# Patient Record
Sex: MALE | Race: WHITE | Employment: OTHER | URBAN - METROPOLITAN AREA
[De-identification: names, ages, dates, MRNs, and addresses within clinical notes are randomized per-mention and may not be internally consistent; named-entity substitution may affect disease eponyms.]

---

## 2020-10-17 ENCOUNTER — APPOINTMENT (OUTPATIENT)
Dept: CT IMAGING | Age: 45
End: 2020-10-17
Payer: MEDICARE

## 2020-10-17 ENCOUNTER — APPOINTMENT (OUTPATIENT)
Dept: GENERAL RADIOLOGY | Age: 45
End: 2020-10-17
Payer: MEDICARE

## 2020-10-17 ENCOUNTER — HOSPITAL ENCOUNTER (EMERGENCY)
Age: 45
Discharge: HOME | End: 2020-10-17
Attending: EMERGENCY MEDICINE
Payer: MEDICARE

## 2020-10-17 VITALS
HEART RATE: 65 BPM | BODY MASS INDEX: 23.58 KG/M2 | SYSTOLIC BLOOD PRESSURE: 135 MMHG | RESPIRATION RATE: 16 BRPM | OXYGEN SATURATION: 98 % | DIASTOLIC BLOOD PRESSURE: 88 MMHG | TEMPERATURE: 98.2 F | HEIGHT: 69 IN | WEIGHT: 159.17 LBS

## 2020-10-17 LAB
A/G RATIO: 2.4 (ref 1.1–2.2)
ALBUMIN SERPL-MCNC: 5 G/DL (ref 3.4–5)
ALP BLD-CCNC: 96 U/L (ref 40–129)
ALT SERPL-CCNC: 26 U/L (ref 10–40)
ANION GAP SERPL CALCULATED.3IONS-SCNC: 11 MMOL/L (ref 3–16)
AST SERPL-CCNC: 24 U/L (ref 15–37)
BASOPHILS ABSOLUTE: 0 K/UL (ref 0–0.2)
BASOPHILS RELATIVE PERCENT: 0.6 %
BILIRUB SERPL-MCNC: 1.5 MG/DL (ref 0–1)
BUN BLDV-MCNC: 12 MG/DL (ref 7–20)
CALCIUM SERPL-MCNC: 9.8 MG/DL (ref 8.3–10.6)
CHLORIDE BLD-SCNC: 101 MMOL/L (ref 99–110)
CO2: 26 MMOL/L (ref 21–32)
CREAT SERPL-MCNC: 0.8 MG/DL (ref 0.9–1.3)
EOSINOPHILS ABSOLUTE: 0.1 K/UL (ref 0–0.6)
EOSINOPHILS RELATIVE PERCENT: 0.7 %
GFR AFRICAN AMERICAN: >60
GFR NON-AFRICAN AMERICAN: >60
GLOBULIN: 2.1 G/DL
GLUCOSE BLD-MCNC: 110 MG/DL (ref 70–99)
HCT VFR BLD CALC: 49.1 % (ref 40.5–52.5)
HEMOGLOBIN: 16.7 G/DL (ref 13.5–17.5)
LYMPHOCYTES ABSOLUTE: 1.6 K/UL (ref 1–5.1)
LYMPHOCYTES RELATIVE PERCENT: 18.7 %
MCH RBC QN AUTO: 33.9 PG (ref 26–34)
MCHC RBC AUTO-ENTMCNC: 34.1 G/DL (ref 31–36)
MCV RBC AUTO: 99.4 FL (ref 80–100)
MONOCYTES ABSOLUTE: 0.7 K/UL (ref 0–1.3)
MONOCYTES RELATIVE PERCENT: 8.3 %
NEUTROPHILS ABSOLUTE: 5.9 K/UL (ref 1.7–7.7)
NEUTROPHILS RELATIVE PERCENT: 71.7 %
PDW BLD-RTO: 14 % (ref 12.4–15.4)
PLATELET # BLD: 233 K/UL (ref 135–450)
PMV BLD AUTO: 8.8 FL (ref 5–10.5)
POTASSIUM REFLEX MAGNESIUM: 3.7 MMOL/L (ref 3.5–5.1)
RBC # BLD: 4.94 M/UL (ref 4.2–5.9)
SODIUM BLD-SCNC: 138 MMOL/L (ref 136–145)
TOTAL PROTEIN: 7.1 G/DL (ref 6.4–8.2)
TROPONIN: <0.01 NG/ML
WBC # BLD: 8.3 K/UL (ref 4–11)

## 2020-10-17 PROCEDURE — 70450 CT HEAD/BRAIN W/O DYE: CPT

## 2020-10-17 PROCEDURE — 80053 COMPREHEN METABOLIC PANEL: CPT

## 2020-10-17 PROCEDURE — 72125 CT NECK SPINE W/O DYE: CPT

## 2020-10-17 PROCEDURE — 6360000004 HC RX CONTRAST MEDICATION: Performed by: NURSE PRACTITIONER

## 2020-10-17 PROCEDURE — 2580000003 HC RX 258: Performed by: NURSE PRACTITIONER

## 2020-10-17 PROCEDURE — 99285 EMERGENCY DEPT VISIT HI MDM: CPT

## 2020-10-17 PROCEDURE — 6360000002 HC RX W HCPCS: Performed by: NURSE PRACTITIONER

## 2020-10-17 PROCEDURE — 6370000000 HC RX 637 (ALT 250 FOR IP): Performed by: NURSE PRACTITIONER

## 2020-10-17 PROCEDURE — 84484 ASSAY OF TROPONIN QUANT: CPT

## 2020-10-17 PROCEDURE — 71260 CT THORAX DX C+: CPT

## 2020-10-17 PROCEDURE — 73030 X-RAY EXAM OF SHOULDER: CPT

## 2020-10-17 PROCEDURE — 85025 COMPLETE CBC W/AUTO DIFF WBC: CPT

## 2020-10-17 PROCEDURE — 96374 THER/PROPH/DIAG INJ IV PUSH: CPT

## 2020-10-17 PROCEDURE — 93005 ELECTROCARDIOGRAM TRACING: CPT | Performed by: NURSE PRACTITIONER

## 2020-10-17 RX ORDER — METHOCARBAMOL 500 MG/1
500 TABLET, FILM COATED ORAL 4 TIMES DAILY
Qty: 40 TABLET | Refills: 0 | Status: SHIPPED | OUTPATIENT
Start: 2020-10-17 | End: 2020-10-27

## 2020-10-17 RX ORDER — KETOROLAC TROMETHAMINE 30 MG/ML
15 INJECTION, SOLUTION INTRAMUSCULAR; INTRAVENOUS ONCE
Status: COMPLETED | OUTPATIENT
Start: 2020-10-17 | End: 2020-10-17

## 2020-10-17 RX ORDER — ORPHENADRINE CITRATE 30 MG/ML
60 INJECTION INTRAMUSCULAR; INTRAVENOUS ONCE
Status: DISCONTINUED | OUTPATIENT
Start: 2020-10-17 | End: 2020-10-17

## 2020-10-17 RX ORDER — TIZANIDINE 4 MG/1
4 TABLET ORAL ONCE
Status: COMPLETED | OUTPATIENT
Start: 2020-10-17 | End: 2020-10-17

## 2020-10-17 RX ORDER — MELOXICAM 7.5 MG/1
7.5 TABLET ORAL DAILY
Qty: 90 TABLET | Refills: 1 | Status: SHIPPED | OUTPATIENT
Start: 2020-10-17 | End: 2021-02-09 | Stop reason: SDUPTHER

## 2020-10-17 RX ORDER — HYDROCODONE BITARTRATE AND ACETAMINOPHEN 5; 325 MG/1; MG/1
1 TABLET ORAL EVERY 4 HOURS PRN
Qty: 18 TABLET | Refills: 0 | Status: SHIPPED | OUTPATIENT
Start: 2020-10-17 | End: 2020-10-20

## 2020-10-17 RX ORDER — 0.9 % SODIUM CHLORIDE 0.9 %
1000 INTRAVENOUS SOLUTION INTRAVENOUS ONCE
Status: COMPLETED | OUTPATIENT
Start: 2020-10-17 | End: 2020-10-17

## 2020-10-17 RX ADMIN — TIZANIDINE 4 MG: 4 TABLET ORAL at 19:33

## 2020-10-17 RX ADMIN — SODIUM CHLORIDE 1000 ML: 9 INJECTION, SOLUTION INTRAVENOUS at 18:50

## 2020-10-17 RX ADMIN — KETOROLAC TROMETHAMINE: 30 INJECTION, SOLUTION INTRAMUSCULAR at 18:51

## 2020-10-17 RX ADMIN — IOPAMIDOL 75 ML: 755 INJECTION, SOLUTION INTRAVENOUS at 19:27

## 2020-10-17 ASSESSMENT — PAIN SCALES - GENERAL
PAINLEVEL_OUTOF10: 5
PAINLEVEL_OUTOF10: 6
PAINLEVEL_OUTOF10: 4
PAINLEVEL_OUTOF10: 3
PAINLEVEL_OUTOF10: 3

## 2020-10-17 ASSESSMENT — PAIN - FUNCTIONAL ASSESSMENT
PAIN_FUNCTIONAL_ASSESSMENT: 0-10
PAIN_FUNCTIONAL_ASSESSMENT: PREVENTS OR INTERFERES SOME ACTIVE ACTIVITIES AND ADLS

## 2020-10-17 ASSESSMENT — PAIN DESCRIPTION - DESCRIPTORS
DESCRIPTORS: SORE
DESCRIPTORS: ACHING;TENDER
DESCRIPTORS: TENDER

## 2020-10-17 ASSESSMENT — PAIN DESCRIPTION - ORIENTATION
ORIENTATION: MID
ORIENTATION: POSTERIOR;LEFT

## 2020-10-17 ASSESSMENT — PAIN DESCRIPTION - PAIN TYPE
TYPE: ACUTE PAIN

## 2020-10-17 ASSESSMENT — PAIN DESCRIPTION - LOCATION
LOCATION: HEAD;SHOULDER
LOCATION: CHEST
LOCATION: HEAD

## 2020-10-17 ASSESSMENT — PAIN DESCRIPTION - FREQUENCY
FREQUENCY: CONTINUOUS

## 2020-10-17 ASSESSMENT — PAIN DESCRIPTION - PROGRESSION
CLINICAL_PROGRESSION: GRADUALLY IMPROVING
CLINICAL_PROGRESSION: GRADUALLY IMPROVING
CLINICAL_PROGRESSION: GRADUALLY WORSENING

## 2020-10-17 ASSESSMENT — PAIN DESCRIPTION - ONSET: ONSET: ON-GOING

## 2020-10-17 NOTE — ED NOTES
Pt returned from ct; pt notes less discomfort with deep breath 4/10; resp easy and even       Luigi Packer, SOO  10/17/20 1933

## 2020-10-17 NOTE — ED NOTES
Report from [facility] to resume care.       Luzma Mcguire RN  10/17/20 5013
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
## 2020-10-18 LAB
EKG ATRIAL RATE: 72 BPM
EKG DIAGNOSIS: NORMAL
EKG P AXIS: 33 DEGREES
EKG P-R INTERVAL: 138 MS
EKG Q-T INTERVAL: 398 MS
EKG QRS DURATION: 100 MS
EKG QTC CALCULATION (BAZETT): 435 MS
EKG R AXIS: 16 DEGREES
EKG T AXIS: 20 DEGREES
EKG VENTRICULAR RATE: 72 BPM

## 2020-10-18 PROCEDURE — 93010 ELECTROCARDIOGRAM REPORT: CPT | Performed by: INTERNAL MEDICINE

## 2020-10-18 NOTE — ED NOTES
D/C: Order noted for d/c. Pt confirmed d/c paperwork does have correct name. Discharge and education instructions reviewed with patient. Teach-back successful. Pt verbalized understanding and signed d/c papers. Pt denied questions at this time. No acute distress noted. Patient instructed to follow-up as noted - return to emergency department if symptoms worsen. Patient verbalized understanding. Discharged per EDMD with discharge instructions. Pt discharged to private vehicle. Patient stable upon departure. Thanked patient for choosing [facility] for care. Provider aware of patient pain at time of discharge.          [place] RN  10/17/20 3167
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
## 2020-11-03 ENCOUNTER — OFFICE VISIT (OUTPATIENT)
Dept: ORTHOPEDIC SURGERY | Age: 45
End: 2020-11-03
Payer: MEDICARE

## 2020-11-03 VITALS — HEIGHT: 69 IN | TEMPERATURE: 97.3 F | BODY MASS INDEX: 23.55 KG/M2 | WEIGHT: 159 LBS

## 2020-11-03 PROBLEM — S46.912A STRAIN OF LEFT SHOULDER: Status: ACTIVE | Noted: 2020-11-03

## 2020-11-03 PROBLEM — F17.200 CURRENT SMOKER: Status: ACTIVE | Noted: 2020-11-03

## 2020-11-03 PROBLEM — S16.1XXA CERVICAL STRAIN: Status: ACTIVE | Noted: 2020-11-03

## 2020-11-03 PROCEDURE — 99203 OFFICE O/P NEW LOW 30 MIN: CPT | Performed by: ORTHOPAEDIC SURGERY
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
## 2020-11-05 ENCOUNTER — TELEPHONE (OUTPATIENT)
Dept: ORTHOPEDIC SURGERY | Age: 45
End: 2020-11-05

## 2020-11-05 RX ORDER — MELOXICAM 7.5 MG/1
7.5 TABLET ORAL DAILY
Qty: 30 TABLET | Refills: 0 | Status: SHIPPED | OUTPATIENT
Start: 2020-11-05 | End: 2021-02-09 | Stop reason: SDUPTHER

## 2020-11-05 NOTE — TELEPHONE ENCOUNTER
Prescription    Medication Name:  Req Meloxicam and Robaxin  Pharmacy: [place] [phone]  Patient Contact Number:  [phone]  Pt states Dr. Shakila Barrera was going to prescribe this Rx's.

## 2020-11-05 NOTE — TELEPHONE ENCOUNTER
Called and LVM for patient. Per , ok to Rx Meloxicam but not Robaxin. Meloxicam has been sent ot pharmacy.

## 2020-12-08 ENCOUNTER — OFFICE VISIT (OUTPATIENT)
Dept: ORTHOPEDIC SURGERY | Age: 45
End: 2020-12-08
Payer: MEDICARE

## 2020-12-08 VITALS — BODY MASS INDEX: 23.55 KG/M2 | WEIGHT: 159 LBS | HEIGHT: 69 IN | TEMPERATURE: 98.9 F

## 2020-12-08 PROBLEM — M62.838 MUSCLE SPASM: Status: ACTIVE | Noted: 2020-12-08

## 2020-12-08 PROCEDURE — G8420 CALC BMI NORM PARAMETERS: HCPCS | Performed by: ORTHOPAEDIC SURGERY

## 2020-12-08 PROCEDURE — G8427 DOCREV CUR MEDS BY ELIG CLIN: HCPCS | Performed by: ORTHOPAEDIC SURGERY

## 2020-12-08 PROCEDURE — 4004F PT TOBACCO SCREEN RCVD TLK: CPT | Performed by: ORTHOPAEDIC SURGERY

## 2020-12-08 PROCEDURE — G8484 FLU IMMUNIZE NO ADMIN: HCPCS | Performed by: ORTHOPAEDIC SURGERY

## 2020-12-08 PROCEDURE — 99214 OFFICE O/P EST MOD 30 MIN: CPT | Performed by: ORTHOPAEDIC SURGERY

## 2020-12-08 RX ORDER — CYCLOBENZAPRINE HCL 5 MG
5 TABLET ORAL NIGHTLY PRN
Qty: 30 TABLET | Refills: 0 | Status: SHIPPED | OUTPATIENT
Start: 2020-12-08 | End: 2021-02-05 | Stop reason: SDUPTHER

## 2020-12-08 RX ORDER — MELOXICAM 7.5 MG/1
7.5 TABLET ORAL 2 TIMES DAILY
Qty: 60 TABLET | Refills: 0 | Status: SHIPPED | OUTPATIENT
Start: 2020-12-08 | End: 2021-02-09
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
## 2021-01-04 ENCOUNTER — HOSPITAL ENCOUNTER (OUTPATIENT)
Dept: PHYSICAL THERAPY | Age: 46
Setting detail: THERAPIES SERIES
Discharge: HOME | End: 2021-01-04
Payer: MEDICARE

## 2021-01-04 PROCEDURE — 97140 MANUAL THERAPY 1/> REGIONS: CPT

## 2021-01-04 PROCEDURE — 97161 PT EVAL LOW COMPLEX 20 MIN: CPT

## 2021-01-04 PROCEDURE — 97110 THERAPEUTIC EXERCISES: CPT

## 2021-01-04 NOTE — PLAN OF CARE
[address] [address], [address] [place] [address] Drive  Phone: [phone]   Fax:     [phone]                                                       Physical Therapy Certification    Dear Referring Practitioner: Sher Osman MD,    We had the pleasure of evaluating the following patient for physical therapy services at [address] [place] [address]. A summary of our findings can be found in the initial assessment below. This includes our plan of care. If you have any questions or concerns regarding these findings, please do not hesitate to contact me at the office phone number checked above. Thank you for the referral.       Physician Signature:_______________________________Date:__________________  By signing above (or electronic signature), therapists plan is approved by physician      Patient: Anju Heart   : 1975   MRN: [id number]  Referring Physician: Referring Practitioner: Sher Osman MD      Evaluation Date: 2021      Medical Diagnosis Information:  Diagnosis: S16. 1XXA (ICD-10-CM) - Strain of neck muscle, initial encounter , [phone] (ICD-10-CM) - Strain of left shoulder, initial encounter   Treatment Diagnosis: Impaired posture, increased muscle tension, decreased cervical ROM strength, BUE decreased strength                                         Insurance information: PT Insurance Information: [place]    Precautions/ Contra-indications:   Latex Allergy:  [x]NO      []YES  Preferred Language for Healthcare:   [x]English       []Other:    C-SSRS Triggered by Intake questionnaire (Past 2 wk assessment ):   [x] No, Questionnaire did not trigger screening.   [] Yes, Patient intake triggered C-SSRS Screening      [] C-SSRS Screening completed  [] PCP notified via Epic    SUBJECTIVE: Patient stated complaint: Mr. Valentín Hare 41 y.o. male presents today for PT evaluation of  shoulder and cervical pain which started to worsen Cervical SB 25 27   Cervical rotation 50 55        ROM Left Right   Shoulder Flex WNL WNL   Shoulder Abd WNL WNL   Shoulder ER C2 C2   Shoulder IR T8 L4             Strength / Myotomes Left Right   Cervical Flexion (C1-2)     Cervical Side-bending (C3)     Shoulder Shrug (C4)     Shoulder Flex 4 4   Shoulder Scap     Shoulder Abduction (C5) 4 4   Shoulder ER 4- 4-   Shoulder IR 4 4   Biceps (C6) 4 4   Triceps (C7) 4 4   Wrist Extension (C6)     Wrist Flexion (C7)           Thumb Abduction (C8)     Finger Abduction (T1)       Lower extremity myotomes:   []Normal     []Abnormal     Joint mobility: CERVICAL   []Normal    [x]Hypo   []Hyper    Orthopaedic Special Tests  Positive  Negative  NT Comments    Hautard's        Rhomberg       Sharps-Darryl  x     Cervical Torsion / Body Rotation   x     C2 Kick       Modified Shear       Compression  x     Distraction   x              [x] Patient history, allergies, meds reviewed. Medical chart reviewed. See intake form. Review Of Systems (ROS):  [x]Performed Review of systems (Integumentary, CardioPulmonary, Neurological) by intake and observation. Intake form has been scanned into medical record. Patient has been instructed to contact their primary care physician regarding ROS issues if not already being addressed at this time.       Co-morbidities/Complexities (which will affect course of rehabilitation):   [x]None           Arthritic conditions   []Rheumatoid arthritis (M05.9)  []Osteoarthritis (M19.91)   Cardiovascular conditions   []Hypertension (I10)  []Hyperlipidemia (E78.5)  []Angina pectoris (I20)  []Atherosclerosis (I70)   Musculoskeletal conditions   []Disc pathology   []Congenital spine pathologies   []Prior surgical intervention  []Osteoporosis (M81.8)  []Osteopenia (M85.8)   Endocrine conditions   []Hypothyroid (E03.9)  []Hyperthyroid Gastrointestinal conditions   []Constipation (Q04.84)   Metabolic conditions   []Morbid obesity (E66.01)  []Diabetes type 1(E10.65) or 2 (E11.65)   []Neuropathy (G60.9)     Pulmonary conditions   []Asthma (J45)  []Coughing   []COPD (J44.9)   Psychological Disorders  []Anxiety (F41.9)  []Depression (F32.9)   []Other:   []Other:          Barriers to/and or personal factors that will affect rehab potential:              []Age  []Sex   []Smoker              []Motivation/Lack of Motivation                        []Co-Morbidities              []Cognitive Function, education/learning barriers              []Environmental, home barriers              []profession/work barriers  []past PT/medical experience  []other:  Justification:     Falls Risk Assessment (30 days):   [x] Falls Risk assessed and no intervention required.   [] Falls Risk assessed and Patient requires intervention due to being higher risk   TUG score (>12s at risk):     [] Falls education provided, including         ASSESSMENT:    Functional Impairments:     [x]Noted cervical/thoracic/GHJ joint hypomobility   []Noted cervical/thoracic/GHJ joint hypermobility   [x]Decreased cervical/UE functional ROM   []Noted Headache pain aggravated by neck movements with/without dizziness   []Abnormal reflexes/sensation/myotomal/dermatomal deficits   []Decreased DCF control or ability to hold head up   [x]Decreased RC/scapular/core strength and neuromuscular control    [x]Decreased UE functional strength   []other:      Functional Activity Limitations (from functional questionnaire and intake)   [x]Reduced ability to tolerate prolonged functional positions   [x]Reduced ability or difficulty with changes of positions or transfers between positions   [x]Reduced ability to maintain good posture and demonstrate good body mechanics with sitting, bending, and lifting   [x] Reduced ability or tolerance with driving and/or computer work   [x]Reduced ability to perform lifting, reaching, carrying tasks   [x]Reduced ability to concentrate   [x]Reduced ability to sleep    [x]Reduced ability to tolerate any impact through UE or spine   [x]Reduced ability to ambulate prolonged functional periods/distances   []other:    Participation Restrictions   [x]Reduced participation in self care activities   [x]Reduced participation in home management activities   [x]Reduced participation in work activities   [x]Reduced participation in social activities. [x]Reduced participation in sport/recreational activities. Classification/Subgrouping:   [x]signs/symptoms consistent with neck pain with mobility deficits     []signs/symptoms consistent with neck pain with movement coordinated impairments    []signs/symptoms consistent with neck pain with radiating pain    []signs/symptoms consistent with neck pain with headaches (cervicogenic)    []Signs/symptoms consistent with nerve root involvement including myotome & dermatome dysfunction   []sign/symptoms consistent with facet dysfunction of cervical and thoracic spine    []signs/symptoms consistent suggesting central cord compression/UMN syndromes   []signs/symptoms consistent with discogenic cervical pain   []signs/symptoms consistent with rib dysfunction   [x]signs/symptoms consistent with postural dysfunction   []signs/symptoms consistent with shoulder pathology    []signs/symptoms consistent with post-surgical status including decreased ROM, strength and function.    []signs/symptoms consistent with pathology which may benefit from Dry Needling   []signs/symptoms which may limit the use of advanced manual therapy techniques: (Hypertension, recent trauma, intolerance to end range positions, prior TIA, visual issues, UE myotomes loss )     Prognosis/Rehab Potential:      []Excellent   [x]Good    []Fair   []Poor    Tolerance of evaluation/treatment:    []Excellent   [x]Good    []Fair   []Poor    Physical Therapy Evaluation Complexity Justification  [x] A history of present problem with:  [x] no personal factors and/or comorbidities that impact the plan of care;  []1-2 personal factors and/or comorbidities that impact the plan of care  []3 personal factors and/or comorbidities that impact the plan of care  [x] An examination of body systems using standardized tests and measures addressing any of the following: body structures and functions (impairments), activity limitations, and/or participation restrictions;:  [] a total of 1-2 or more elements   [x] a total of 3 or more elements   [] a total of 4 or more elements   [x] A clinical presentation with:  [x] stable and/or uncomplicated characteristics   [] evolving clinical presentation with changing characteristics  [] unstable and unpredictable characteristics;   [x] Clinical decision making of [x] low, [] moderate, [] high complexity using standardized patient assessment instrument and/or measurable assessment of functional outcome. [x] EVAL (LOW) 08289 (typically 20 minutes face-to-face)  [] EVAL (MOD) 38683 (typically 30 minutes face-to-face)  [] EVAL (HIGH) 57721 (typically 45 minutes face-to-face)  [] RE-EVAL     PLAN:   Frequency/Duration:  1-2 days per week for 6 Weeks:  Interventions:  [x]  Therapeutic exercise including: strength training, ROM, for cervical spine,scapula, core and Upper extremity, including postural re-education. [x]  NMR activation and proprioception for Deep cervical flexors, periscapular and RC muscles and Core, including postural re-education. [x]  Manual therapy as indicated for C/T spine, ribs, Soft tissue to include: Dry Needling/IASTM, STM, PROM, Gr I-IV mobilizations, manipulation. [x] Modalities as needed that may include: thermal agents, E-stim, Biofeedback, US, iontophoresis as indicated  [x] Patient education on joint protection, postural re-education, activity modification, progression of HEP.      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 Independent in HEP and progression per patient tolerance, in order to prevent re-injury. [] Progressing: [] Met: [] Not Met: [] Adjusted  2. Patient will have a decrease in pain to facilitate improvement in movement, function, and ADLs as indicated by Functional Deficits. [] Progressing: [] Met: [] Not Met: [] Adjusted    Long Term Goals: To be achieved in: 6 weeks  1. Disability index score of 15% or less for the NDI to assist with reaching prior level of function. [] Progressing: [] Met: [] Not Met: [] Adjusted  2. Patient will demonstrate increased AROM to [facility] of cervical/thoracic spine to allow for proper joint functioning as indicated by patients Functional Deficits. [] Progressing: [] Met: [] Not Met: [] Adjusted  3. Patient will demonstrate an increase in postural awareness and control and activation of  Deep cervical stabilizers to allow for proper functional mobility as indicated by patients Functional Deficits. [] Progressing: [] Met: [] Not Met: [] Adjusted  4. Patient will return to functional activities including work, lifting, without increased symptoms or restriction.    [] Progressing: [] Met: [] Not Met: [] Adjusted       Electronically signed by:  Diallo Wilson PT

## 2021-01-04 NOTE — FLOWSHEET NOTE
[address] [facility]  Phone: [phone]   Fax: [phone]    Physical Therapy Treatment Note/ Progress Report:     Date:  2021    Patient Name:  Meliton Mendoza    :  1975  MRN: [id number]  Restrictions/Precautions:    Medical/Treatment Diagnosis Information:  Diagnosis: S16. 1XXA (ICD-10-CM) - Strain of neck muscle, initial encounter , [phone] (ICD-10-CM) - Strain of left shoulder, initial encounter  Treatment Diagnosis: Impaired posture, increased muscle tension, decreased cervical ROM strength, BUE decreased strength  Insurance/Certification information:  PT Insurance Information: [place]  Physician Information:  Referring Practitioner: Negar Herrmann MD  Plan of care signed (Y/N): []  Yes [x]  No     Date of Patient follow up with Physician:      Progress Report: []  Yes  [x]  No     Date Range for reporting period:  Beginnin21  Ending:     Progress report due (10 Rx/or 30 days whichever is less): visit #10 or      Recertification due (POC duration/ or 90 days whichever is less): visit #12 or 2/15/21    Visit # Insurance Allowable Auth required?  Date Range    30 []  Yes  [x]  No      Latex Allergy:  [x]NO      []YES  Preferred Language for Healthcare:   [x]English       []other:    Functional Scale:         Date assessed:  NDI: raw score =*; dysfunction = %    npv    Pain level:  5/10     SUBJECTIVE:  See eval    OBJECTIVE: See eval   Observation:    Test measurements:      RESTRICTIONS/PRECAUTIONS:     Exercises/Interventions:   Therapeutic Exercise (80749) Resistance / level Sets/sec Reps Notes   HEP    21   UBE       pulleys                                                 Therapeutic Activities (81755)                                                 NMR re-education (25563)       Postural strengthening                                    Manual Intervention (17957)       STM, SOR, gentle distraction to cervical spine, pain free PROM x8 min                                              Patient education:  -pt educated on diagnosis, prognosis and expectations for rehab  -all pt questions were answered    Home Exercise Program:  Access Code: [email or web address]   URL: [email or web address]   Date: 01/04/2021   Prepared by: Yaquelin Omer     Exercises   Seated Scapular Retraction - 10 reps - 3 sets - 1x daily - 7x weekly   Seated Cervical Retraction - 10 reps - 3 sets - 1x daily - 7x weekly   Standing Isometric Cervical Flexion with Manual Resistance - 10 reps - 3 sets - 1x daily - 7x weekly   Standing Isometric Cervical Extension with Manual Resistance - 10 reps - 3 sets - 1x daily - 7x weekly   Seated Isometric Cervical Sidebending - 10 reps - 3 sets - 1x daily - 7x weekly       Therapeutic Exercise and NMR EXR  [x] (28370) Provided verbal/tactile cueing for activities related to strengthening, flexibility, endurance, ROM  for improvements in cervical, postural, scapular, scapulothoracic and UE control with self care, reaching, carrying, lifting, house/yardwork, driving/computer work.    [] (33408) Provided verbal/tactile cueing for activities related to improving balance, coordination, kinesthetic sense, posture, motor skill, proprioception  to assist with cervical, scapular, scapulothoracic and UE control with self care, reaching, carrying, lifting, house/yardwork, driving/computer work.  [] (01951) Therapist is in constant attendance of 2 or more patients providing skilled therapy interventions, but not providing any significant amount of measurable one-on-one time to either patient, for improvements in cervical, scapular, scapulothoracic and UE control with self care, reaching, carrying, lifting, house/yardwork, driving, computer work.      Therapeutic Activities:    [] (10954 or 54675) Provided verbal/tactile cueing for activities related to improving balance, coordination, kinesthetic sense, posture, motor skill, proprioception and motor activation to allow for proper function of cervical, scapular, scapulothoracic and UE control with self care, carrying, lifting, driving/computer work. Home Exercise Program:    [x] (50265) Reviewed/Progressed HEP activities related to strengthening, flexibility, endurance, ROM of cervical, scapular, scapulothoracic and UE control with self care, reaching, carrying, lifting, house/yardwork, driving/computer work  [] (29756) Reviewed/Progressed HEP activities related to improving balance, coordination, kinesthetic sense, posture, motor skill, proprioception of cervical, scapular, scapulothoracic and UE control with self care, reaching, carrying, lifting, house/yardwork, driving/computer work      Manual Treatments:  PROM / STM / Oscillations-Mobs:  G-I, II, III, IV (PA's, Inf., Post.)  [] (44920) Provided manual therapy to mobilize soft tissue/joints of cervical/CT, scapular GHJ and UE for the purpose of decreasing headache, modulating pain, promoting relaxation,  increasing ROM, reducing/eliminating soft tissue swelling/inflammation/restriction, improving soft tissue extensibility and allowing for proper ROM for normal function with self care, reaching, carrying, lifting, house/yardwork, driving/computer work    Modalities:  Possible HEAT/ESTIM? 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  Independent in HEP and progression per patient tolerance, in order to prevent re-injury. [] Progressing: [] Met: [] Not Met: [] Adjusted  2. Patient will have a decrease in pain to facilitate improvement in movement, function, and ADLs as indicated by Functional Deficits. [] Progressing: [] Met: [] Not Met: [] Adjusted    Long Term Goals: To be achieved in: 6 weeks  1. Disability index score of 15% or less for the NDI to assist with reaching prior level of function. [] Progressing: [] Met: [] Not Met: [] Adjusted  2. Patient will demonstrate increased AROM to [facility] of cervical/thoracic spine to allow for proper joint functioning as indicated by patients Functional Deficits. [] Progressing: [] Met: [] Not Met: [] Adjusted  3. Patient will demonstrate an increase in postural awareness and control and activation of  Deep cervical stabilizers to allow for proper functional mobility as indicated by patients Functional Deficits. [] Progressing: [] Met: [] Not Met: [] Adjusted  4. Patient will return to functional activities including work, lifting, without increased symptoms or restriction. [] Progressing: [] Met: [] Not Met: [] Adjusted    Overall Progression Towards Functional goals/ Treatment Progress Update:  [] Patient is progressing as expected towards functional goals listed. [] Progression is slowed due to complexities/Impairments listed. [] Progression has been slowed due to co-morbidities.   [x] Plan just implemented, too soon to assess goals progression <30days   [] Goals require adjustment due to lack of progress  [] Patient is not progressing as expected and requires additional follow up with physician  [] Other    Persisting Functional Limitations/Impairments:  []Sitting []Standing   []Walking []Squatting/bending    []Stairs []ADL's    []Transfers []Reaching  []Housework []Lifting  []Driving []Job related tasks  []Sports/Recreation  []Sleeping  []Other:    ASSESSMENT:  See eval  Treatment/Activity Tolerance:  [] Patient able to complete tx  [] Patient limited by fatique  [] Patient limited by pain  [] Patient limited by other medical complications  [] Other:     Prognosis: [x] Good [] Fair  [] Poor    Patient Requires Follow-up: [x] Yes  [] No    PLAN: See eval. PT 1-2x / week for 6 weeks. Consider dry needling if no progress   [] Continue per plan of care [] Alter current plan (see comments)  [x] Plan of care initiated [] Hold pending MD visit [] Discharge    Electronically signed by: Addison Velasco PT       Note: If patient does not return for scheduled/ recommended follow up visits, this note will serve as a discharge from care along with most recent update on progress.

## 2021-01-11 ENCOUNTER — HOSPITAL ENCOUNTER (OUTPATIENT)
Dept: PHYSICAL THERAPY | Age: 46
Setting detail: THERAPIES SERIES
Discharge: HOME | End: 2021-01-11
Payer: MEDICARE

## 2021-01-11 PROCEDURE — 97110 THERAPEUTIC EXERCISES: CPT

## 2021-01-11 PROCEDURE — 97140 MANUAL THERAPY 1/> REGIONS: CPT

## 2021-01-11 PROCEDURE — G0283 ELEC STIM OTHER THAN WOUND: HCPCS

## 2021-01-11 NOTE — FLOWSHEET NOTE
NMR re-education (93302)       Prone chin tucks   5''x15     Prone scap retraction   5''x15            Postural strengthening                                           Manual Intervention (07800)       STM to upper trap, SOR, gentle distraction to cervical spine, pain free PROM x10 min                                              Patient education:  -pt educated on diagnosis, prognosis and expectations for rehab  -all pt questions were answered    Home Exercise Program:  Access Code: [email or web address]   URL: [email or web address]   Date: 01/04/2021   Prepared by: Axel Kathryn     Exercises   Seated Scapular Retraction - 10 reps - 3 sets - 1x daily - 7x weekly   Seated Cervical Retraction - 10 reps - 3 sets - 1x daily - 7x weekly   Standing Isometric Cervical Flexion with Manual Resistance - 10 reps - 3 sets - 1x daily - 7x weekly   Standing Isometric Cervical Extension with Manual Resistance - 10 reps - 3 sets - 1x daily - 7x weekly   Seated Isometric Cervical Sidebending - 10 reps - 3 sets - 1x daily - 7x weekly       Therapeutic Exercise and NMR EXR  [x] (83956) Provided verbal/tactile cueing for activities related to strengthening, flexibility, endurance, ROM  for improvements in cervical, postural, scapular, scapulothoracic and UE control with self care, reaching, carrying, lifting, house/yardwork, driving/computer work.    [] (86797) Provided verbal/tactile cueing for activities related to improving balance, coordination, kinesthetic sense, posture, motor skill, proprioception  to assist with cervical, scapular, scapulothoracic and UE control with self care, reaching, carrying, lifting, house/yardwork, driving/computer work.  [] (69363) Therapist is in constant attendance of 2 or more patients providing skilled therapy interventions, but not providing any significant amount of measurable one-on-one time to either patient, for improvements in cervical, scapular, scapulothoracic and UE control with self care, reaching, carrying, lifting, house/yardwork, driving, computer work. Therapeutic Activities:    [] (62111 or 12248) Provided verbal/tactile cueing for activities related to improving balance, coordination, kinesthetic sense, posture, motor skill, proprioception and motor activation to allow for proper function of cervical, scapular, scapulothoracic and UE control with self care, carrying, lifting, driving/computer work.      Home Exercise Program:    [x] (08634) Reviewed/Progressed HEP activities related to strengthening, flexibility, endurance, ROM of cervical, scapular, scapulothoracic and UE control with self care, reaching, carrying, lifting, house/yardwork, driving/computer work  [] (09409) Reviewed/Progressed HEP activities related to improving balance, coordination, kinesthetic sense, posture, motor skill, proprioception of cervical, scapular, scapulothoracic and UE control with self care, reaching, carrying, lifting, house/yardwork, driving/computer work      Manual Treatments:  PROM / STM / Oscillations-Mobs:  G-I, II, III, IV (PA's, Inf., Post.)  [x] (73856) Provided manual therapy to mobilize soft tissue/joints of cervical/CT, scapular GHJ and UE for the purpose of decreasing headache, modulating pain, promoting relaxation,  increasing ROM, reducing/eliminating soft tissue swelling/inflammation/restriction, improving soft tissue extensibility and allowing for proper ROM for normal function with self care, reaching, carrying, lifting, house/yardwork, driving/computer work    Modalities:  IFC to neck/ mid back for 12 minutes with P    Charges:  Timed Code Treatment Minutes: 30   Total Treatment Minutes: 45       [] EVAL - LOW (25501)   [] EVAL - MOD (19843)  [] EVAL - HIGH (50340)  [] RE-EVAL (02903)  [x] FF(33218) x 1         [] NMR (53287) x      [] Ionto  [x] Manual (88855) x 1       [] Ultrasound  [] TA x       [] Mech Traction (98571)  [] Home Management Training x   [x] ES (un) (61221):           [] Aquatic    [] ES(attended) (74069)   [] Group    [] Other:    GOALS:  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 Independent in HEP and progression per patient tolerance, in order to prevent re-injury. [] Progressing: [] Met: [] Not Met: [] Adjusted  2. Patient will have a decrease in pain to facilitate improvement in movement, function, and ADLs as indicated by Functional Deficits. [] Progressing: [] Met: [] Not Met: [] Adjusted    Long Term Goals: To be achieved in: 6 weeks  1. Disability index score of 15% or less for the NDI to assist with reaching prior level of function. [] Progressing: [] Met: [] Not Met: [] Adjusted  2. Patient will demonstrate increased AROM to [facility] of cervical/thoracic spine to allow for proper joint functioning as indicated by patients Functional Deficits. [] Progressing: [] Met: [] Not Met: [] Adjusted  3. Patient will demonstrate an increase in postural awareness and control and activation of  Deep cervical stabilizers to allow for proper functional mobility as indicated by patients Functional Deficits. [] Progressing: [] Met: [] Not Met: [] Adjusted  4. Patient will return to functional activities including work, lifting, without increased symptoms or restriction. [] Progressing: [] Met: [] Not Met: [] Adjusted    Overall Progression Towards Functional goals/ Treatment Progress Update:  [] Patient is progressing as expected towards functional goals listed. [] Progression is slowed due to complexities/Impairments listed. [] Progression has been slowed due to co-morbidities.   [x] Plan just implemented, too soon to assess goals progression <30days   [] Goals require adjustment due to lack of progress  [] Patient is not progressing as expected and requires additional follow up with physician  [] Other    Persisting Functional Limitations/Impairments:  []Sitting []Standing   []Walking []Squatting/bending    []Stairs []ADL's    []Transfers []Reaching  []Housework []Lifting  []Driving []Job related tasks  []Sports/Recreation  []Sleeping  []Other:    ASSESSMENT:  Pt tolerated tx fairly today with increase in pain to R shoulder/periscap area with UBE, responded well to manual and ESTIM. Pt performed exercises slowly due to apprehension to movement. Assess pt response at next visit. Treatment/Activity Tolerance:  [] Patient able to complete tx  [] Patient limited by fatique  [] Patient limited by pain  [] Patient limited by other medical complications  [] Other:     Prognosis: [x] Good [] Fair  [] Poor    Patient Requires Follow-up: [x] Yes  [] No    PLAN: See eval. PT 1-2x / week for 6 weeks. Consider dry needling if no progress   [x] Continue per plan of care [] Alter current plan (see comments)  [] Plan of care initiated [] Hold pending MD visit [] Discharge    Electronically signed by: Randy Daniels PT       Note: If patient does not return for scheduled/ recommended follow up visits, this note will serve as a discharge from care along with most recent update on progress.

## 2021-01-14 ENCOUNTER — HOSPITAL ENCOUNTER (OUTPATIENT)
Dept: PHYSICAL THERAPY | Age: 46
Setting detail: THERAPIES SERIES
Discharge: HOME | End: 2021-01-14
Payer: MEDICARE

## 2021-01-14 PROCEDURE — G0283 ELEC STIM OTHER THAN WOUND: HCPCS

## 2021-01-14 PROCEDURE — 97140 MANUAL THERAPY 1/> REGIONS: CPT

## 2021-01-14 PROCEDURE — 97110 THERAPEUTIC EXERCISES: CPT

## 2021-01-14 NOTE — FLOWSHEET NOTE
[address] [facility] Physical Therapy  Phone: [phone]   Fax: [phone]    Physical Therapy Treatment Note/ Progress Report:     Date:  2021    Patient Name:  Edilia Hester    :  1975  MRN: [id number]  Restrictions/Precautions:    Medical/Treatment Diagnosis Information:  Diagnosis: S16. 1XXA (ICD-10-CM) - Strain of neck muscle, initial encounter , [phone] (ICD-10-CM) - Strain of left shoulder, initial encounter  Treatment Diagnosis: Impaired posture, increased muscle tension, decreased cervical ROM strength, BUE decreased strength  Insurance/Certification information:  PT Insurance Information: [place]  Physician Information:  Referring Practitioner: Marius Spatz, MD  Plan of care signed (Y/N): []  Yes [x]  No     Date of Patient follow up with Physician:      Progress Report: []  Yes  [x]  No     Date Range for reporting period:  Beginnin21  Ending:     Progress report due (10 Rx/or 30 days whichever is less): visit #10 or 24     Recertification due (POC duration/ or 90 days whichever is less): visit #12 or 2/15/21    Visit # Insurance Allowable Auth required? Date Range   3/12 30 []  Yes  [x]  No      Latex Allergy:  [x]NO      []YES  Preferred Language for Healthcare:   [x]English       []other:    Functional Scale:         Date assessed:  NDI: raw score =*; dysfunction = %        Pain level:  4-6/10 mid back      SUBJECTIVE:  Pt reports he may have gotten a little relief after last treatment but did not last long and pain today is 5/10- ACHY. 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       OBJECTIVE:    Observation:    Test measurements:      RESTRICTIONS/PRECAUTIONS:     Exercises/Interventions:   Therapeutic Exercise (55958) Resistance / level Sets/sec Reps Notes   UBE 1.0   Painful - stopped    pulleys  x4 min      Chin tucks  5''x10     Upper trap stretch  2x30''     Post shoulder stretch   2x30'' Doorway pec stretch   2x30''  Hands high x2  Hands low x2           TB:  Mid row  Ext                                                                        Therapeutic Activities (83495)                                                                             NMR re-education (40293)       Prone chin tucks       Prone scap retraction              Postural strengthening               Nerve flossing                             Manual Intervention (59208)       STM to upper trap, SOR, gentle distraction to cervical spine, pain free PROM x12 min                                              Patient education:  -pt educated on diagnosis, prognosis and expectations for rehab  -all pt questions were answered    Home Exercise Program:  Access Code: [email or web address]   URL: [employer]/   Date: 01/04/2021   Prepared by: López Herder     Exercises   Seated Scapular Retraction - 10 reps - 3 sets - 1x daily - 7x weekly   Seated Cervical Retraction - 10 reps - 3 sets - 1x daily - 7x weekly   Standing Isometric Cervical Flexion with Manual Resistance - 10 reps - 3 sets - 1x daily - 7x weekly   Standing Isometric Cervical Extension with Manual Resistance - 10 reps - 3 sets - 1x daily - 7x weekly   Seated Isometric Cervical Sidebending - 10 reps - 3 sets - 1x daily - 7x weekly     Access Code: [email or web address]   URL: [employer]/   Date: 01/14/2021   Prepared by: López Herder     Exercises   Seated Levator Scapulae Stretch - 10 reps - 3 sets - 1x daily - 7x weekly   Seated Cervical Sidebending Stretch - 10 reps - 3 sets - 1x daily - 7x weekly   Doorway Pec Stretch at 90 Degrees Abduction - 10 reps - 3 sets - 1x daily - 7x weekly   Standing Shoulder Posterior Capsule Stretch - 10 reps - 3 sets - 1x daily - 7x weekly         Therapeutic Exercise and NMR EXR  [x] (03179) Provided verbal/tactile cueing for activities related to strengthening, flexibility, endurance, ROM  for improvements in cervical, postural, scapular, scapulothoracic and UE control with self care, reaching, carrying, lifting, house/yardwork, driving/computer work.    [] (05870) Provided verbal/tactile cueing for activities related to improving balance, coordination, kinesthetic sense, posture, motor skill, proprioception  to assist with cervical, scapular, scapulothoracic and UE control with self care, reaching, carrying, lifting, house/yardwork, driving/computer work.  [] (53116) Therapist is in constant attendance of 2 or more patients providing skilled therapy interventions, but not providing any significant amount of measurable one-on-one time to either patient, for improvements in cervical, scapular, scapulothoracic and UE control with self care, reaching, carrying, lifting, house/yardwork, driving, computer work. Therapeutic Activities:    [] (41169 or 12633) Provided verbal/tactile cueing for activities related to improving balance, coordination, kinesthetic sense, posture, motor skill, proprioception and motor activation to allow for proper function of cervical, scapular, scapulothoracic and UE control with self care, carrying, lifting, driving/computer work.      Home Exercise Program:    [x] (31406) Reviewed/Progressed HEP activities related to strengthening, flexibility, endurance, ROM of cervical, scapular, scapulothoracic and UE control with self care, reaching, carrying, lifting, house/yardwork, driving/computer work  [] (47065) Reviewed/Progressed HEP activities related to improving balance, coordination, kinesthetic sense, posture, motor skill, proprioception of cervical, scapular, scapulothoracic and UE control with self care, reaching, carrying, lifting, house/yardwork, driving/computer work      Manual Treatments:  PROM / STM / Oscillations-Mobs:  G-I, II, III, IV (PA's, Inf., Post.)  [x] (37139) Provided manual therapy to mobilize soft tissue/joints of cervical/CT, scapular GHJ and UE for the purpose of decreasing headache, modulating pain, promoting relaxation,  increasing ROM, reducing/eliminating soft tissue swelling/inflammation/restriction, improving soft tissue extensibility and allowing for proper ROM for normal function with self care, reaching, carrying, lifting, house/yardwork, driving/computer work    Modalities:  IFC to neck/ mid back for 15 minutes with MHP    Charges:  Timed Code Treatment Minutes: 40   Total Treatment Minutes: 55       [] EVAL - LOW (11888)   [] EVAL - MOD (03002)  [] EVAL - HIGH (30781)  [] RE-EVAL (68274)  [x] YH(24043) x 2         [] NMR (34544) x      [] Ionto  [x] Manual (88870) x 1       [] Ultrasound  [] TA x       [] Mech Traction (80675)  [] Home Management Training x   [x] ES (un) (87670):           [] Aquatic    [] ES(attended) (59425)   [] Group    [] Other:    GOALS:  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 Independent in HEP and progression per patient tolerance, in order to prevent re-injury. [] Progressing: [] Met: [] Not Met: [] Adjusted  2. Patient will have a decrease in pain to facilitate improvement in movement, function, and ADLs as indicated by Functional Deficits. [] Progressing: [] Met: [] Not Met: [] Adjusted    Long Term Goals: To be achieved in: 6 weeks  1. Disability index score of 15% or less for the NDI to assist with reaching prior level of function. [] Progressing: [] Met: [] Not Met: [] Adjusted  2. Patient will demonstrate increased AROM to [facility] of cervical/thoracic spine to allow for proper joint functioning as indicated by patients Functional Deficits. [] Progressing: [] Met: [] Not Met: [] Adjusted  3. Patient will demonstrate an increase in postural awareness and control and activation of  Deep cervical stabilizers to allow for proper functional mobility as indicated by patients Functional Deficits.    [] Progressing: [] Met: [] Not Met: [] Adjusted  4. Patient will return to functional activities including work, lifting, without increased symptoms or restriction. [] Progressing: [] Met: [] Not Met: [] Adjusted    Overall Progression Towards Functional goals/ Treatment Progress Update:  [] Patient is progressing as expected towards functional goals listed. [] Progression is slowed due to complexities/Impairments listed. [] Progression has been slowed due to co-morbidities. [x] Plan just implemented, too soon to assess goals progression <30days   [] Goals require adjustment due to lack of progress  [] Patient is not progressing as expected and requires additional follow up with physician  [] Other    Persisting Functional Limitations/Impairments:  []Sitting []Standing   []Walking []Squatting/bending    []Stairs []ADL's    []Transfers []Reaching  []Housework []Lifting  []Driving []Job related tasks  []Sports/Recreation  []Sleeping  []Other:    ASSESSMENT:  Pt with good session today with tx focus on manual and stretches. Pt reports stretches felt good and PT educated pt to do stretches 2-3x/day due to increased muscle tension in neck/ mid back. Handout given. Treatment/Activity Tolerance:  [] Patient able to complete tx  [] Patient limited by fatique  [] Patient limited by pain  [] Patient limited by other medical complications  [] Other:     Prognosis: [x] Good [] Fair  [] Poor    Patient Requires Follow-up: [x] Yes  [] No    PLAN: See eval. PT 1-2x / week for 6 weeks. Consider dry needling if no progress   [x] Continue per plan of care [] Alter current plan (see comments)  [] Plan of care initiated [] Hold pending MD visit [] Discharge    Electronically signed by: Stacey Thomas PT       Note: If patient does not return for scheduled/ recommended follow up visits, this note will serve as a discharge from care along with most recent update on progress.

## 2021-01-18 ENCOUNTER — HOSPITAL ENCOUNTER (OUTPATIENT)
Dept: PHYSICAL THERAPY | Age: 46
Setting detail: THERAPIES SERIES
Discharge: HOME | End: 2021-01-18
Payer: MEDICARE

## 2021-01-18 PROCEDURE — 97110 THERAPEUTIC EXERCISES: CPT

## 2021-01-18 PROCEDURE — 97112 NEUROMUSCULAR REEDUCATION: CPT

## 2021-01-18 NOTE — FLOWSHEET NOTE
[address] [facility] Physical Therapy  Phone: [phone]   Fax: [phone]    Physical Therapy Treatment Note/ Progress Report:     Date:  2021    Patient Name:  Anju Heart    :  1975  MRN: [id number]  Restrictions/Precautions:    Medical/Treatment Diagnosis Information:  Diagnosis: S16. 1XXA (ICD-10-CM) - Strain of neck muscle, initial encounter , [phone] (ICD-10-CM) - Strain of left shoulder, initial encounter  Treatment Diagnosis: Impaired posture, increased muscle tension, decreased cervical ROM strength, BUE decreased strength  Insurance/Certification information:  PT Insurance Information: [place]  Physician Information:  Referring Practitioner: Sher Osman MD  Plan of care signed (Y/N): []  Yes [x]  No     Date of Patient follow up with Physician:      Progress Report: []  Yes  [x]  No     Date Range for reporting period:  Beginnin21  Ending:     Progress report due (10 Rx/or 30 days whichever is less): visit #10 or 06     Recertification due (POC duration/ or 90 days whichever is less): visit #12 or 2/15/21    Visit # Insurance Allowable Auth required? Date Range    30 []  Yes  [x]  No      Latex Allergy:  [x]NO      []YES  Preferred Language for Healthcare:   [x]English       []other:    Functional Scale:         Date assessed:  NDI: raw score =*; dysfunction = %        Pain level:  4-6/10 mid back      SUBJECTIVE:  Pt reports when he puts his arms above 90 degrees when sleeping he gets a cold, numbness feeling. Also when he lays on the couch on his side he has his arm up and symptoms can come on with that as well.      OBJECTIVE:    Observation:    Test measurements:      RESTRICTIONS/PRECAUTIONS:     Exercises/Interventions:   Therapeutic Exercise (45974) Resistance / level Sets/sec Reps Notes   UBE 1.0   Painful - stopped    pulleys  x4 min      Chin tucks  5''x10     Upper trap stretch  2x30''     Post shoulder stretch   2x30''     Doorway pec stretch   2x30''  Hands high x2  Hands low x2    Standing thread the needle   5''x10 B      S/L open book  5''x5 B     TB:   Mid row  Ext                                                                        Therapeutic Activities (41196)                                                                             NMR re-education (56225)       Prone chin tucks       Prone scap retraction              Postural strengthening               Median Nerve flossing   Ulnar nerve flossing   5''x5 B  5''x5 B                          Manual Intervention (44653)       STM to upper trap, SOR, gentle distraction to cervical spine, pain free PROM                                              Patient education:  -pt educated on diagnosis, prognosis and expectations for rehab  -all pt questions were answered    Home Exercise Program:  Access Code: [email or web address]   URL: [email or web address]   Date: 01/04/2021   Prepared by: Lucien Holt     Exercises   Seated Scapular Retraction - 10 reps - 3 sets - 1x daily - 7x weekly   Seated Cervical Retraction - 10 reps - 3 sets - 1x daily - 7x weekly   Standing Isometric Cervical Flexion with Manual Resistance - 10 reps - 3 sets - 1x daily - 7x weekly   Standing Isometric Cervical Extension with Manual Resistance - 10 reps - 3 sets - 1x daily - 7x weekly   Seated Isometric Cervical Sidebending - 10 reps - 3 sets - 1x daily - 7x weekly     Access Code: [email or web address]   URL: [employer]/   Date: 01/14/2021   Prepared by: Lucien Holt     Exercises   Seated Levator Scapulae Stretch - 10 reps - 3 sets - 1x daily - 7x weekly   Seated Cervical Sidebending Stretch - 10 reps - 3 sets - 1x daily - 7x weekly   Doorway Pec Stretch at 90 Degrees Abduction - 10 reps - 3 sets - 1x daily - 7x weekly   Standing Shoulder Posterior Capsule Stretch - 10 reps - 3 sets - 1x daily - 7x weekly       Access Code: [email or web address]   URL: [employer]/   Date: 01/18/2021   Prepared by: Lucien Holt Exercises   Sidelying Thoracic Rotation - 10 reps - 3 sets - 1x daily - 7x weekly   Median Nerve Flossing - Tray - 10 reps - 3 sets - 1x daily - 7x weekly   Standing Radial Nerve Glide - 10 reps - 3 sets - 1x daily - 7x weekly   Ulnar Nerve Flossing - 10 reps - 3 sets - 1x daily - 7x weekly         Therapeutic Exercise and NMR EXR  [x] (13437) Provided verbal/tactile cueing for activities related to strengthening, flexibility, endurance, ROM  for improvements in cervical, postural, scapular, scapulothoracic and UE control with self care, reaching, carrying, lifting, house/yardwork, driving/computer work.    [] (40097) Provided verbal/tactile cueing for activities related to improving balance, coordination, kinesthetic sense, posture, motor skill, proprioception  to assist with cervical, scapular, scapulothoracic and UE control with self care, reaching, carrying, lifting, house/yardwork, driving/computer work.  [] (95121) Therapist is in constant attendance of 2 or more patients providing skilled therapy interventions, but not providing any significant amount of measurable one-on-one time to either patient, for improvements in cervical, scapular, scapulothoracic and UE control with self care, reaching, carrying, lifting, house/yardwork, driving, computer work. Therapeutic Activities:    [] (02313 or 17944) Provided verbal/tactile cueing for activities related to improving balance, coordination, kinesthetic sense, posture, motor skill, proprioception and motor activation to allow for proper function of cervical, scapular, scapulothoracic and UE control with self care, carrying, lifting, driving/computer work.      Home Exercise Program:    [x] (40806) Reviewed/Progressed HEP activities related to strengthening, flexibility, endurance, ROM of cervical, scapular, scapulothoracic and UE control with self care, reaching, carrying, lifting, house/yardwork, driving/computer work  [] (45412) Reviewed/Progressed HEP activities related to improving balance, coordination, kinesthetic sense, posture, motor skill, proprioception of cervical, scapular, scapulothoracic and UE control with self care, reaching, carrying, lifting, house/yardwork, driving/computer work      Manual Treatments:  PROM / STM / Oscillations-Mobs:  G-I, II, III, IV (PA's, Inf., Post.)  [x] (19754) Provided manual therapy to mobilize soft tissue/joints of cervical/CT, scapular GHJ and UE for the purpose of decreasing headache, modulating pain, promoting relaxation,  increasing ROM, reducing/eliminating soft tissue swelling/inflammation/restriction, improving soft tissue extensibility and allowing for proper ROM for normal function with self care, reaching, carrying, lifting, house/yardwork, driving/computer work    Modalities:  I    Charges:  Timed Code Treatment Minutes: 46   Total Treatment Minutes: 46       [] EVAL - LOW (62459)   [] EVAL - MOD (33082)  [] EVAL - HIGH (95630)  [] RE-EVAL (37283)  [x] DD(89472) x 2         [x] NMR (11507) x 1     [] Ionto  [] Manual (06719) x 1       [] Ultrasound  [] TA x       [] Mech Traction (65916)  [] Home Management Training x   [] ES (un) (39451):           [] Aquatic    [] ES(attended) (78324)   [] Group    [] Other:    GOALS:  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 Independent in HEP and progression per patient tolerance, in order to prevent re-injury. [] Progressing: [] Met: [] Not Met: [] Adjusted  2. Patient will have a decrease in pain to facilitate improvement in movement, function, and ADLs as indicated by Functional Deficits. [] Progressing: [] Met: [] Not Met: [] Adjusted    Long Term Goals: To be achieved in: 6 weeks  1. Disability index score of 15% or less for the NDI to assist with reaching prior level of function.    [] Progressing: [] Met: [] Not Met: [] Adjusted  2. Patient will demonstrate increased AROM to [facility] of cervical/thoracic spine to allow for proper joint functioning as indicated by patients Functional Deficits. [] Progressing: [] Met: [] Not Met: [] Adjusted  3. Patient will demonstrate an increase in postural awareness and control and activation of  Deep cervical stabilizers to allow for proper functional mobility as indicated by patients Functional Deficits. [] Progressing: [] Met: [] Not Met: [] Adjusted  4. Patient will return to functional activities including work, lifting, without increased symptoms or restriction. [] Progressing: [] Met: [] Not Met: [] Adjusted    Overall Progression Towards Functional goals/ Treatment Progress Update:  [] Patient is progressing as expected towards functional goals listed. [] Progression is slowed due to complexities/Impairments listed. [] Progression has been slowed due to co-morbidities. [x] Plan just implemented, too soon to assess goals progression <30days   [] Goals require adjustment due to lack of progress  [] Patient is not progressing as expected and requires additional follow up with physician  [] Other    Persisting Functional Limitations/Impairments:  []Sitting []Standing   []Walking []Squatting/bending    []Stairs []ADL's    []Transfers []Reaching  []Housework []Lifting  []Driving []Job related tasks  []Sports/Recreation  []Sleeping  []Other:    ASSESSMENT:  Pt presents with signs and symptoms consistent with Upper extremity nerve tension , upper trap over activation and thoracic outlet syndrome symptoms. Pt requiring cues for decrease upper trap recruitment during entire treatment. No estim today due to pt not getting any relief from it. Continue to progress tx to address above symptoms.      Treatment/Activity Tolerance:  [] Patient able to complete tx  [] Patient limited by fatique  [] Patient limited by pain  [] Patient limited by other medical complications  [] Other: Prognosis: [x] Good [] Fair  [] Poor    Patient Requires Follow-up: [x] Yes  [] No    PLAN: See eval. PT 1-2x / week for 6 weeks. Consider dry needling if no progress   [x] Continue per plan of care [] Alter current plan (see comments)  [] Plan of care initiated [] Hold pending MD visit [] Discharge    Electronically signed by: Ang Rodriguez, PT       Note: If patient does not return for scheduled/ recommended follow up visits, this note will serve as a discharge from care along with most recent update on progress.

## 2021-01-21 ENCOUNTER — HOSPITAL ENCOUNTER (OUTPATIENT)
Dept: PHYSICAL THERAPY | Age: 46
Setting detail: THERAPIES SERIES
Discharge: HOME | End: 2021-01-21
Payer: MEDICARE

## 2021-01-21 PROCEDURE — 97530 THERAPEUTIC ACTIVITIES: CPT

## 2021-01-21 PROCEDURE — 97110 THERAPEUTIC EXERCISES: CPT

## 2021-01-21 NOTE — FLOWSHEET NOTE
[address] [facility]  Phone: [phone]   Fax: [phone]    Physical Therapy Treatment Note/ Progress Report:     Date:  2021    Patient Name:  Rajan Tinajero    :  1975  MRN: [id number]  Restrictions/Precautions:    Medical/Treatment Diagnosis Information:  Diagnosis: S16. 1XXA (ICD-10-CM) - Strain of neck muscle, initial encounter , [phone] (ICD-10-CM) - Strain of left shoulder, initial encounter  Treatment Diagnosis: Impaired posture, increased muscle tension, decreased cervical ROM strength, BUE decreased strength  Insurance/Certification information:  PT Insurance Information: [place]  Physician Information:  Referring Practitioner: Tomas Shrestha MD  Plan of care signed (Y/N): []  Yes [x]  No     Date of Patient follow up with Physician:      Progress Report: []  Yes  [x]  No     Date Range for reporting period:  Beginnin21  Ending:     Progress report due (10 Rx/or 30 days whichever is less): visit #10 or 3/8/77     Recertification due (POC duration/ or 90 days whichever is less): visit #12 or 2/15/21    Visit # Insurance Allowable Auth required? Date Range    30 []  Yes  [x]  No      Latex Allergy:  [x]NO      []YES  Preferred Language for Healthcare:   [x]English       []other:    Functional Scale:         Date assessed:  NDI: raw score =*; dysfunction = %        Pain level:  4/10 mid back      SUBJECTIVE:   Pt reports pain is not as bad lately due to stretches and nerve flosses. Pt did go play disc golf yesterday and is alittle sore from that and kind of dizzy at times. Pt reports he is doing HEP periodically throughout the day.      OBJECTIVE:    Observation:    Test measurements:  : Pulse - 62 bpm - normal rhythm     RESTRICTIONS/PRECAUTIONS:     Exercises/Interventions:   Therapeutic Exercise (44487) Resistance / level Sets/sec Reps Notes   UBE 1.0   Painful - stopped    pulleys  x2 min      Chin tucks  5''x10     Upper trap stretch  2x30''     Post shoulder stretch   2x30''     Doorway pec stretch   2x30''  Hands high x2  Hands low x2    Standing thread the needle   5''x10 B      S/L open book  5''x5 B     TB:   Mid row  Ext         No money ER       Serratus push up        Chin tuck with BUE flexion to 90   5''x10     Thoracic ext sitting at chair   5''X10  Towel at T6                                      Therapeutic Activities (57097)       diaphramic breathing in supine   X8MIN  Max cues on how to perform                                                                  NMR re-education (95706)       Prone chin tucks       Prone scap retraction              Postural strengthening               Median Nerve flossing   Ulnar nerve flossing   5''x5 B  5''x5 B                          Manual Intervention (63665)       STM to upper trap, SOR, gentle distraction to cervical spine, pain free PROM                                              Patient education:  -pt educated on diagnosis, prognosis and expectations for rehab  -all pt questions were answered    Home Exercise Program:  Access Code: [email or web address]   URL: [email or web address]   Date: 01/04/2021   Prepared by: Ardell Lawnside     Exercises   Seated Scapular Retraction - 10 reps - 3 sets - 1x daily - 7x weekly   Seated Cervical Retraction - 10 reps - 3 sets - 1x daily - 7x weekly   Standing Isometric Cervical Flexion with Manual Resistance - 10 reps - 3 sets - 1x daily - 7x weekly   Standing Isometric Cervical Extension with Manual Resistance - 10 reps - 3 sets - 1x daily - 7x weekly   Seated Isometric Cervical Sidebending - 10 reps - 3 sets - 1x daily - 7x weekly     Access Code: [email or web address]   URL: [email or web address]   Date: 01/14/2021   Prepared by: Ardell Lawnside     Exercises   Seated Levator Scapulae Stretch - 10 reps - 3 sets - 1x daily - 7x weekly   Seated Cervical Sidebending Stretch - 10 reps - 3 sets - 1x daily - 7x weekly   Doorway Pec Stretch at 90 Degrees Abduction - 10 reps - 3 sets - 1x daily - 7x weekly   Standing Shoulder Posterior Capsule Stretch - 10 reps - 3 sets - 1x daily - 7x weekly       Access Code: [email or web address]   URL: [email or web address]   Date: 01/18/2021   Prepared by: Lianna Ling     Exercises   Sidelying Thoracic Rotation - 10 reps - 3 sets - 1x daily - 7x weekly   Median Nerve Flossing - Tray - 10 reps - 3 sets - 1x daily - 7x weekly   Standing Radial Nerve Glide - 10 reps - 3 sets - 1x daily - 7x weekly   Ulnar Nerve Flossing - 10 reps - 3 sets - 1x daily - 7x weekly     Access Code: [email or web address]   URL: [email or web address]   Date: 01/21/2021   Prepared by: Lianna Ling     Exercises   Supine Diaphragmatic Breathing - 10 reps - 3 sets - 1x daily - 7x weekly   Seated Thoracic Self-Mobilization - 10 reps - 3 sets - 1x daily - 7x weekly         Therapeutic Exercise and NMR EXR  [x] (93490) Provided verbal/tactile cueing for activities related to strengthening, flexibility, endurance, ROM  for improvements in cervical, postural, scapular, scapulothoracic and UE control with self care, reaching, carrying, lifting, house/yardwork, driving/computer work.    [] (38622) Provided verbal/tactile cueing for activities related to improving balance, coordination, kinesthetic sense, posture, motor skill, proprioception  to assist with cervical, scapular, scapulothoracic and UE control with self care, reaching, carrying, lifting, house/yardwork, driving/computer work.  [] (38434) Therapist is in constant attendance of 2 or more patients providing skilled therapy interventions, but not providing any significant amount of measurable one-on-one time to either patient, for improvements in cervical, scapular, scapulothoracic and UE control with self care, reaching, carrying, lifting, house/yardwork, driving, computer work.      Therapeutic Activities:    [] (13336 or 10634) Provided verbal/tactile cueing for activities related to improving balance, coordination, kinesthetic sense, posture, motor skill, proprioception and motor activation to allow for proper function of cervical, scapular, scapulothoracic and UE control with self care, carrying, lifting, driving/computer work. Home Exercise Program:    [x] (73905) Reviewed/Progressed HEP activities related to strengthening, flexibility, endurance, ROM of cervical, scapular, scapulothoracic and UE control with self care, reaching, carrying, lifting, house/yardwork, driving/computer work  [] (94637) Reviewed/Progressed HEP activities related to improving balance, coordination, kinesthetic sense, posture, motor skill, proprioception of cervical, scapular, scapulothoracic and UE control with self care, reaching, carrying, lifting, house/yardwork, driving/computer work      Manual Treatments:  PROM / STM / Oscillations-Mobs:  G-I, II, III, IV (PA's, Inf., Post.)  [x] (33110) Provided manual therapy to mobilize soft tissue/joints of cervical/CT, scapular GHJ and UE for the purpose of decreasing headache, modulating pain, promoting relaxation,  increasing ROM, reducing/eliminating soft tissue swelling/inflammation/restriction, improving soft tissue extensibility and allowing for proper ROM for normal function with self care, reaching, carrying, lifting, house/yardwork, driving/computer work    Modalities:  I    Charges:  Timed Code Treatment Minutes: 41   Total Treatment Minutes: 41       [] EVAL - LOW (09793)   [] EVAL - MOD (14683)  [] EVAL - HIGH (20270)  [] RE-EVAL (99622)  [x] GH(01458) x 2         [] NMR (20623) x 1     [] Ionto  [] Manual (13810) x 1       [] Ultrasound  [x] TA x 1      [] Mech Traction (85616)  [] Home Management Training x   [] ES (un) (43258):           [] Aquatic    [] ES(attended) (84259)   [] Group    [] Other:    GOALS:  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 Independent in HEP and progression per patient tolerance, in order to prevent re-injury. [] Progressing: [] Met: [] Not Met: [] Adjusted  2. Patient will have a decrease in pain to facilitate improvement in movement, function, and ADLs as indicated by Functional Deficits. [] Progressing: [] Met: [] Not Met: [] Adjusted    Long Term Goals: To be achieved in: 6 weeks  1. Disability index score of 15% or less for the NDI to assist with reaching prior level of function. [] Progressing: [] Met: [] Not Met: [] Adjusted  2. Patient will demonstrate increased AROM to [facility] of cervical/thoracic spine to allow for proper joint functioning as indicated by patients Functional Deficits. [] Progressing: [] Met: [] Not Met: [] Adjusted  3. Patient will demonstrate an increase in postural awareness and control and activation of  Deep cervical stabilizers to allow for proper functional mobility as indicated by patients Functional Deficits. [] Progressing: [] Met: [] Not Met: [] Adjusted  4. Patient will return to functional activities including work, lifting, without increased symptoms or restriction. [] Progressing: [] Met: [] Not Met: [] Adjusted    Overall Progression Towards Functional goals/ Treatment Progress Update:  [] Patient is progressing as expected towards functional goals listed. [] Progression is slowed due to complexities/Impairments listed. [] Progression has been slowed due to co-morbidities.   [x] Plan just implemented, too soon to assess goals progression <30days   [] Goals require adjustment due to lack of progress  [] Patient is not progressing as expected and requires additional follow up with physician  [] Other    Persisting Functional Limitations/Impairments:  []Sitting []Standing   []Walking []Squatting/bending    []Stairs []ADL's    []Transfers []Reaching  []Housework []Lifting  []Driving []Job related tasks  []Sports/Recreation  []Sleeping  []Other:    ASSESSMENT:  PT focused on relaxation and diaphragatic breathing exercises due to over working scalene muscles causing pain. Pt at end of treatment feeling fatigued and slightly dizzy. Pt educated that if it gets worse to go to ER or Doctor to get it checked out, but he thinks its from playing disc golf yesterday and doing too much. Treatment/Activity Tolerance:  [x] Patient able to complete tx  [] Patient limited by fatique  [x] Patient limited by pain  [] Patient limited by other medical complications  [] Other:     Prognosis: [x] Good [] Fair  [] Poor    Patient Requires Follow-up: [x] Yes  [] No    PLAN: See eval. PT 1-2x / week for 6 weeks. TOS, decrease upper trap recruitment, proper breathing, decrease neural tension. Consider dry needling if no progress   [x] Continue per plan of care [] Alter current plan (see comments)  [] Plan of care initiated [] Hold pending MD visit [] Discharge    Electronically signed by: Tarsha Garland, PT       Note: If patient does not return for scheduled/ recommended follow up visits, this note will serve as a discharge from care along with most recent update on progress.

## 2021-01-25 ENCOUNTER — HOSPITAL ENCOUNTER (OUTPATIENT)
Dept: PHYSICAL THERAPY | Age: 46
Setting detail: THERAPIES SERIES
Discharge: HOME | End: 2021-01-25
Payer: MEDICARE

## 2021-01-25 PROCEDURE — 97110 THERAPEUTIC EXERCISES: CPT

## 2021-01-25 PROCEDURE — 97530 THERAPEUTIC ACTIVITIES: CPT

## 2021-01-25 NOTE — FLOWSHEET NOTE
[id number] [facility]  Phone: [phone]   Fax: [phone]    Physical Therapy Treatment Note/ Progress Report:     Date:  2021    Patient Name:  Luis Alberto Terrazas    :  1975  MRN: [id number]  Restrictions/Precautions:    Medical/Treatment Diagnosis Information:  Diagnosis: S16. 1XXA (ICD-10-CM) - Strain of neck muscle, initial encounter , [phone] (ICD-10-CM) - Strain of left shoulder, initial encounter  Treatment Diagnosis: Impaired posture, increased muscle tension, decreased cervical ROM strength, BUE decreased strength  Insurance/Certification information:  PT Insurance Information: [place]  Physician Information:  Referring Practitioner: Joshua Cochran MD  Plan of care signed (Y/N): [x]  Yes []  No     Date of Patient follow up with Physician:      Progress Report: [x]  Yes  []  No     Date Range for reporting period:  Beginnin21  Endin21    Progress report due (10 Rx/or 30 days whichever is less): completed      Recertification due (POC duration/ or 90 days whichever is less): visit #12 or 2/15/21    Visit # Insurance Allowable Auth required? Date Range    30 []  Yes  [x]  No      Latex Allergy:  [x]NO      []YES  Preferred Language for Healthcare:   [x]English       []other:    Functional Scale:         Date assessed:  NDI: raw score = 27; dysfunction = 54%    21    Pain level:  4/10 mid back      SUBJECTIVE:   Pt feeling a lot better today and this weekend, no dizziness or issues. Pt reported it might have been some anxiety last visit. Pt denies any headaches lately , no difficulty with balance or gait. Reports some tenderness to upper cervical spine . OBJECTIVE:         Observation: upper trap over activation , rounded shoulders, FAIR posture     Test measurements:     21    EAST test - + for parathesias in B hands after 20 seconds.  Vascular refill in B hands - normal     Dermatomes Normal Abnormal Comments   Top of head (C1)         Posterior occipital region (C2)         Side of neck (C3)         Top of shoulder (C4) x       Lateral deltoid (C5) x       Tip of thumb (C6) x       Distal middle finger (C7) x       Distal fifth finger (C8) x       Medial forearm (T1) x       Lower extremity               Reflexes Normal Abnormal Comments   C5-6 Biceps x       C5-6 Brachioradialis x       C7-8 Triceps x       Linaress x       S1-2 Seated achilles x        S1-2 Prone knee bend         L3-4 Patellar tendon x        Clonus x        Babinski               CERV ROM L R L 1/25 R 1/25   Cervical Flexion 35   35   Cervical Extension 42   40   Cervical SB 25 27 25 25   Cervical rotation 50 55 45 45            ROM Left Right L 1/25 R 1/25   Shoulder Flex WNL WNL WNL WNL   Shoulder Abd WNL WNL WNL WNL   Shoulder ER C2 C2     Shoulder IR T8 L4                       Strength / Myotomes Left Right L 1/25 R 1/25   Cervical Flexion (C1-2)        Cervical Side-bending (C3)        Shoulder Shrug (C4)        Shoulder Flex 4 4 4 4   Shoulder Scap        Shoulder Abduction (C5) 4 4 4 4   Shoulder ER 4- 4- 4- 4-   Shoulder IR 4 4 4 4   Biceps (C6) 4 4 4 4   Triceps (C7) 4 4 4 4   Wrist Extension (C6)        Wrist Flexion (C7)                 Thumb Abduction (C8)        Finger Abduction (T1)           Lower extremity myotomes:   [x]? Normal                      []?Abnormal      Joint mobility:  mid thoracic               []?Normal                      [x]? Hypo              []?Hyper     Orthopaedic Special Tests  Positive  Negative  NT Comments    Hautard's            Rhomberg           Sharps-Darryl   x       Cervical Torsion / Body Rotation    x       C2 Kick   x        Modified Shear           Compression   x       Distraction    x                         RESTRICTIONS/PRECAUTIONS:     Exercises/Interventions:   Therapeutic Exercise (51827) Resistance / level Sets/sec Reps Notes   UBE 1.0   Painful - stopped    pulleys  x2 min      Chin tucks 5''x10     Upper trap stretch  2x30''     Post shoulder stretch   2x30''     Doorway pec stretch   2x30''  Hands high x2  Hands low x2    Standing thread the needle   5''x10 B      S/L open book  5''x5 B     TB:   Mid row  Ext         No money ER       Serratus push up        Chin tuck with BUE flexion to 90   5''x10     Thoracic ext sitting at chair   5''X10  Towel at T6   Sitting cervical AROM SB   x20  Pt Hands stabilizing C5-7                               Therapeutic Activities (25549)       diaphramic breathing in supine   X3 MIN  Min cues to perform 1/25    progress note/reassessment  x45 min   1/25                                                           NMR re-education (79606)       Prone chin tucks       Prone scap retraction              Postural strengthening               Median Nerve flossing   Ulnar nerve flossing   5''x5 B  5''x5 B                          Manual Intervention (02128)       STM to upper trap, SOR, gentle distraction to cervical spine, pain free PROM                                              Patient education:  -pt educated on diagnosis, prognosis and expectations for rehab  -all pt questions were answered    Home Exercise Program:  Access Code: [email or web address]   URL: [employer]/   Date: 01/04/2021   Prepared by: Versa Day     Exercises   Seated Scapular Retraction - 10 reps - 3 sets - 1x daily - 7x weekly   Seated Cervical Retraction - 10 reps - 3 sets - 1x daily - 7x weekly   Standing Isometric Cervical Flexion with Manual Resistance - 10 reps - 3 sets - 1x daily - 7x weekly   Standing Isometric Cervical Extension with Manual Resistance - 10 reps - 3 sets - 1x daily - 7x weekly   Seated Isometric Cervical Sidebending - 10 reps - 3 sets - 1x daily - 7x weekly     Access Code: [email or web address]   URL: [email or web address]   Date: 01/14/2021   Prepared by: Versa Day     Exercises   Seated Levator Scapulae Stretch - 10 reps - 3 sets - 1x daily - 7x weekly   Seated Cervical Sidebending Stretch - 10 reps - 3 sets - 1x daily - 7x weekly   Doorway Pec Stretch at 90 Degrees Abduction - 10 reps - 3 sets - 1x daily - 7x weekly   Standing Shoulder Posterior Capsule Stretch - 10 reps - 3 sets - 1x daily - 7x weekly       Access Code: [email or web address]   URL: [email or web address]   Date: 01/18/2021   Prepared by: Lucien Counts     Exercises   Sidelying Thoracic Rotation - 10 reps - 3 sets - 1x daily - 7x weekly   Median Nerve Flossing - Tray - 10 reps - 3 sets - 1x daily - 7x weekly   Standing Radial Nerve Glide - 10 reps - 3 sets - 1x daily - 7x weekly   Ulnar Nerve Flossing - 10 reps - 3 sets - 1x daily - 7x weekly     Access Code: [email or web address]   URL: [email or web address]   Date: 01/21/2021   Prepared by: Lucien Counts     Exercises   Supine Diaphragmatic Breathing - 10 reps - 3 sets - 1x daily - 7x weekly   Seated Thoracic Self-Mobilization - 10 reps - 3 sets - 1x daily - 7x weekly         Therapeutic Exercise and NMR EXR  [x] (70876) Provided verbal/tactile cueing for activities related to strengthening, flexibility, endurance, ROM  for improvements in cervical, postural, scapular, scapulothoracic and UE control with self care, reaching, carrying, lifting, house/yardwork, driving/computer work.    [] (35712) Provided verbal/tactile cueing for activities related to improving balance, coordination, kinesthetic sense, posture, motor skill, proprioception  to assist with cervical, scapular, scapulothoracic and UE control with self care, reaching, carrying, lifting, house/yardwork, driving/computer work.  [] (47825) Therapist is in constant attendance of 2 or more patients providing skilled therapy interventions, but not providing any significant amount of measurable one-on-one time to either patient, for improvements in cervical, scapular, scapulothoracic and UE control with self care, reaching, carrying, lifting, house/yardwork, driving, computer work.      Therapeutic Activities:    [x] (30237 or ) Provided verbal/tactile cueing for activities related to improving balance, coordination, kinesthetic sense, posture, motor skill, proprioception and motor activation to allow for proper function of cervical, scapular, scapulothoracic and UE control with self care, carrying, lifting, driving/computer work.      Home Exercise Program:    [x] (89635) Reviewed/Progressed HEP activities related to strengthening, flexibility, endurance, ROM of cervical, scapular, scapulothoracic and UE control with self care, reaching, carrying, lifting, house/yardwork, driving/computer work  [] (39298) Reviewed/Progressed HEP activities related to improving balance, coordination, kinesthetic sense, posture, motor skill, proprioception of cervical, scapular, scapulothoracic and UE control with self care, reaching, carrying, lifting, house/yardwork, driving/computer work      Manual Treatments:  PROM / STM / Oscillations-Mobs:  G-I, II, III, IV (PA's, Inf., Post.)  [x] (60569) Provided manual therapy to mobilize soft tissue/joints of cervical/CT, scapular GHJ and UE for the purpose of decreasing headache, modulating pain, promoting relaxation,  increasing ROM, reducing/eliminating soft tissue swelling/inflammation/restriction, improving soft tissue extensibility and allowing for proper ROM for normal function with self care, reaching, carrying, lifting, house/yardwork, driving/computer work    Modalities:  I    Charges:  Timed Code Treatment Minutes: 58   Total Treatment Minutes: 58       [] EVAL - LOW (13747)   [] EVAL - MOD (72140)  [] EVAL - HIGH (36933)  [] RE-EVAL (99877)  [x] RG(98900) x 1        [] NMR (77931) x 1     [] Ionto  [] Manual (04474) x 1       [] Ultrasound  [x] TA x 3    [] Mech Traction (60273)  [] Home Management Training x   [] ES (un) (02995):           [] Aquatic    [] ES(attended) (12825)   [] Group    [] Other:    GOALS:  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 Independent in HEP and progression per patient tolerance, in order to prevent re-injury. [x] Progressing: [] Met: [] Not Met: [] Adjusted  2. Patient will have a decrease in pain to facilitate improvement in movement, function, and ADLs as indicated by Functional Deficits. [x] Progressing: [] Met: [] Not Met: [] Adjusted    Long Term Goals: To be achieved in: 6 weeks  1. Disability index score of 15% or less for the NDI to assist with reaching prior level of function. [x] Progressing: [] Met: [] Not Met: [] Adjusted  2. Patient will demonstrate increased AROM to [facility] of cervical/thoracic spine to allow for proper joint functioning as indicated by patients Functional Deficits. [x] Progressing: [] Met: [] Not Met: [] Adjusted  3. Patient will demonstrate an increase in postural awareness and control and activation of  Deep cervical stabilizers to allow for proper functional mobility as indicated by patients Functional Deficits. [x] Progressing: [] Met: [] Not Met: [] Adjusted  4. Patient will return to functional activities including work, lifting, without increased symptoms or restriction. [x] Progressing: [] Met: [] Not Met: [] Adjusted    Overall Progression Towards Functional goals/ Treatment Progress Update:  [] Patient is progressing as expected towards functional goals listed. [] Progression is slowed due to complexities/Impairments listed. [] Progression has been slowed due to co-morbidities.   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 Pt with no s/s of neurological complications with neuro screen above. Pt seems to have pain \" jump around\" and move on him , sometimes in unpredictable pattern. Pt continues to present with signs and symptoms most consistent with Thoracic outlet syndrome, breathing accessory muscle overactivation, impaired posture, and upper trap over activation all contributing to his symptoms. Pt also fatigues easily with movements, exercises, stretches and requires rest break. Plan is for PT to continue for next couple treatments and if no more progress to refer back to Dr. Rosey Beard for re-assessment and possible MRI. Treatment/Activity Tolerance:  [x] Patient able to complete tx  [] Patient limited by fatique  [x] Patient limited by pain  [] Patient limited by other medical complications  [] Other:     Prognosis: [x] Good [] Fair  [] Poor    Patient Requires Follow-up: [x] Yes  [] No    PLAN: See eval. PT 1-2x / week for 6 weeks. TOS, decrease upper trap recruitment, proper breathing, decrease neural tension. Consider dry needling if no progress   [x] Continue per plan of care [] Alter current plan (see comments)  [] Plan of care initiated [] Hold pending MD visit [] Discharge    Electronically signed by: Eli Mckeon PT       Note: If patient does not return for scheduled/ recommended follow up visits, this note will serve as a discharge from care along with most recent update on progress.

## 2021-01-28 ENCOUNTER — HOSPITAL ENCOUNTER (OUTPATIENT)
Dept: PHYSICAL THERAPY | Age: 46
Setting detail: THERAPIES SERIES
Discharge: HOME | End: 2021-01-28
Payer: MEDICARE

## 2021-01-28 PROCEDURE — 97112 NEUROMUSCULAR REEDUCATION: CPT

## 2021-01-28 NOTE — FLOWSHEET NOTE
region (C2)         Side of neck (C3)         Top of shoulder (C4) x       Lateral deltoid (C5) x       Tip of thumb (C6) x       Distal middle finger (C7) x       Distal fifth finger (C8) x       Medial forearm (T1) x       Lower extremity               Reflexes Normal Abnormal Comments   C5-6 Biceps x       C5-6 Brachioradialis x       C7-8 Triceps x       Linaress x       S1-2 Seated achilles x        S1-2 Prone knee bend         L3-4 Patellar tendon x        Clonus x        Babinski               CERV ROM L R L 1/25 R 1/25   Cervical Flexion 35   35   Cervical Extension 42   40   Cervical SB 25 27 25 25   Cervical rotation 50 55 45 45            ROM Left Right L 1/25 R 1/25   Shoulder Flex WNL WNL WNL WNL   Shoulder Abd WNL WNL WNL WNL   Shoulder ER C2 C2     Shoulder IR T8 L4                       Strength / Myotomes Left Right L 1/25 R 1/25   Cervical Flexion (C1-2)        Cervical Side-bending (C3)        Shoulder Shrug (C4)        Shoulder Flex 4 4 4 4   Shoulder Scap        Shoulder Abduction (C5) 4 4 4 4   Shoulder ER 4- 4- 4- 4-   Shoulder IR 4 4 4 4   Biceps (C6) 4 4 4 4   Triceps (C7) 4 4 4 4   Wrist Extension (C6)        Wrist Flexion (C7)                 Thumb Abduction (C8)        Finger Abduction (T1)           Lower extremity myotomes:   [x]? Normal                      []?Abnormal      Joint mobility:  mid thoracic               []?Normal                      [x]? Hypo              []?Hyper     Orthopaedic Special Tests  Positive  Negative  NT Comments    Hautard's            Rhomberg           Sharps-Darryl   x       Cervical Torsion / Body Rotation    x       C2 Kick   x        Modified Shear           Compression   x       Distraction    x                         RESTRICTIONS/PRECAUTIONS:     Exercises/Interventions:   Therapeutic Exercise (67133) or [id number] Resistance / level Sets/sec Reps Notes   UBE 1.0   Painful - stopped    pulleys        Chin tucks  5''x10     Upper trap stretch  2x30'' 10 reps - 3 sets - 1x daily - 7x weekly   Doorway Pec Stretch at 90 Degrees Abduction - 10 reps - 3 sets - 1x daily - 7x weekly   Standing Shoulder Posterior Capsule Stretch - 10 reps - 3 sets - 1x daily - 7x weekly       Access Code: [email or web address]   URL: [employer]/   Date: 01/18/2021   Prepared by: Elias Castro     Exercises   Sidelying Thoracic Rotation - 10 reps - 3 sets - 1x daily - 7x weekly   Median Nerve Flossing - Tray - 10 reps - 3 sets - 1x daily - 7x weekly   Standing Radial Nerve Glide - 10 reps - 3 sets - 1x daily - 7x weekly   Ulnar Nerve Flossing - 10 reps - 3 sets - 1x daily - 7x weekly     Access Code: [email or web address]   URL: [email or web address]   Date: 01/21/2021   Prepared by: Elias Castro     Exercises   Supine Diaphragmatic Breathing - 10 reps - 3 sets - 1x daily - 7x weekly   Seated Thoracic Self-Mobilization - 10 reps - 3 sets - 1x daily - 7x weekly         Therapeutic Exercise and NMR EXR  [x] (36129) Provided verbal/tactile cueing for activities related to strengthening, flexibility, endurance, ROM  for improvements in cervical, postural, scapular, scapulothoracic and UE control with self care, reaching, carrying, lifting, house/yardwork, driving/computer work.    [] (65335) Provided verbal/tactile cueing for activities related to improving balance, coordination, kinesthetic sense, posture, motor skill, proprioception  to assist with cervical, scapular, scapulothoracic and UE control with self care, reaching, carrying, lifting, house/yardwork, driving/computer work.  [] (20768) Therapist is in constant attendance of 2 or more patients providing skilled therapy interventions, but not providing any significant amount of measurable one-on-one time to either patient, for improvements in cervical, scapular, scapulothoracic and UE control with self care, reaching, carrying, lifting, house/yardwork, driving, computer work.      Therapeutic Activities:    [x] (32660 or 82953) Provided verbal/tactile cueing for activities related to improving balance, coordination, kinesthetic sense, posture, motor skill, proprioception and motor activation to allow for proper function of cervical, scapular, scapulothoracic and UE control with self care, carrying, lifting, driving/computer work.      Home Exercise Program:    [x] (33001) Reviewed/Progressed HEP activities related to strengthening, flexibility, endurance, ROM of cervical, scapular, scapulothoracic and UE control with self care, reaching, carrying, lifting, house/yardwork, driving/computer work  [] (16681) Reviewed/Progressed HEP activities related to improving balance, coordination, kinesthetic sense, posture, motor skill, proprioception of cervical, scapular, scapulothoracic and UE control with self care, reaching, carrying, lifting, house/yardwork, driving/computer work      Manual Treatments:  PROM / STM / Oscillations-Mobs:  G-I, II, III, IV (PA's, Inf., Post.)  [x] (01475) Provided manual therapy to mobilize soft tissue/joints of cervical/CT, scapular GHJ and UE for the purpose of decreasing headache, modulating pain, promoting relaxation,  increasing ROM, reducing/eliminating soft tissue swelling/inflammation/restriction, improving soft tissue extensibility and allowing for proper ROM for normal function with self care, reaching, carrying, lifting, house/yardwork, driving/computer work    Modalities:  I    Charges:  Timed Code Treatment Minutes: 40   Total Treatment Minutes: 40       [] EVAL - LOW (39706)   [] EVAL - MOD (60664)  [] EVAL - HIGH (13902)  [] RE-EVAL (49593)  [] NP(03946) x 1        [x] NMR (49713) x 3     [] Ionto  [] Manual (27543) x 1       [] Ultrasound  [] TA x 3    [] Mech Traction (50904)  [] Home Management Training x   [] ES (un) (01021):           [] Aquatic    [] ES(attended) (70447)   [] Group    [] Other:    GOALS:  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 Independent in HEP and progression per patient tolerance, in order to prevent re-injury. [x] Progressing: [] Met: [] Not Met: [] Adjusted  2. Patient will have a decrease in pain to facilitate improvement in movement, function, and ADLs as indicated by Functional Deficits. [x] Progressing: [] Met: [] Not Met: [] Adjusted    Long Term Goals: To be achieved in: 6 weeks  1. Disability index score of 15% or less for the NDI to assist with reaching prior level of function. [x] Progressing: [] Met: [] Not Met: [] Adjusted  2. Patient will demonstrate increased AROM to [facility] of cervical/thoracic spine to allow for proper joint functioning as indicated by patients Functional Deficits. [x] Progressing: [] Met: [] Not Met: [] Adjusted  3. Patient will demonstrate an increase in postural awareness and control and activation of  Deep cervical stabilizers to allow for proper functional mobility as indicated by patients Functional Deficits. [x] Progressing: [] Met: [] Not Met: [] Adjusted  4. Patient will return to functional activities including work, lifting, without increased symptoms or restriction. [x] Progressing: [] Met: [] Not Met: [] Adjusted    Overall Progression Towards Functional goals/ Treatment Progress Update:  [] Patient is progressing as expected towards functional goals listed. [] Progression is slowed due to complexities/Impairments listed. [] Progression has been slowed due to co-morbidities.   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 Pt will consider this later if he wants to do it. Tx focus on postural strengthening this date with TB exercises above in bold. Pt tolerated well with no increases in pain. Pt performed very slow and controlled to try not and aggravate any of his pain. Assess pt tolerance at next visit. Treatment/Activity Tolerance:  [x] Patient able to complete tx  [] Patient limited by fatique  [x] Patient limited by pain  [] Patient limited by other medical complications  [] Other:     Prognosis: [x] Good [] Fair  [] Poor    Patient Requires Follow-up: [x] Yes  [] No    PLAN: See eval. PT 1-2x / week for 6 weeks. TOS, decrease upper trap recruitment, proper breathing, decrease neural tension. Consider dry needling if no progress   [x] Continue per plan of care [] Alter current plan (see comments)  [] Plan of care initiated [] Hold pending MD visit [] Discharge    Electronically signed by: Eli Mckeon PT       Note: If patient does not return for scheduled/ recommended follow up visits, this note will serve as a discharge from care along with most recent update on progress.

## 2021-02-01 ENCOUNTER — HOSPITAL ENCOUNTER (OUTPATIENT)
Dept: PHYSICAL THERAPY | Age: 46
Setting detail: THERAPIES SERIES
Discharge: HOME | End: 2021-02-01
Payer: MEDICARE

## 2021-02-01 PROCEDURE — 97110 THERAPEUTIC EXERCISES: CPT

## 2021-02-01 PROCEDURE — 97530 THERAPEUTIC ACTIVITIES: CPT

## 2021-02-01 NOTE — FLOWSHEET NOTE
[address] [facility] Physical Therapy  Phone: [phone]   Fax: [phone]    Physical Therapy Treatment Note/ Progress Report:     Date:  2021    Patient Name:  Ruben Rich    :  1975  MRN: [id number]  Restrictions/Precautions:    Medical/Treatment Diagnosis Information:  Diagnosis: S16. 1XXA (ICD-10-CM) - Strain of neck muscle, initial encounter , [phone] (ICD-10-CM) - Strain of left shoulder, initial encounter  Treatment Diagnosis: Impaired posture, increased muscle tension, decreased cervical ROM strength, BUE decreased strength  Insurance/Certification information:  PT Insurance Information: [place]  Physician Information:  Referring Practitioner: Va Casper MD  Plan of care signed (Y/N): [x]  Yes []  No     Date of Patient follow up with Physician:      Progress Report: [x]  Yes  []  No     Date Range for reporting period:  Beginnin21  Endin21    Progress report due (10 Rx/or 30 days whichever is less): completed      Recertification due (POC duration/ or 90 days whichever is less): visit #12 or 2/15/21    Visit # Insurance Allowable Auth required? Date Range    30 []  Yes  [x]  No      Latex Allergy:  [x]NO      []YES  Preferred Language for Healthcare:   [x]English       []other:    Functional Scale:         Date assessed:  NDI: raw score = 27; dysfunction = 54%    21    Pain level:  3/10 mid back , R shoulder , neck     SUBJECTIVE:   Pt reports when he sits around and hangs out , it is hard to get comfortable. Didn't do anything strenuous. Pt states most of his tension today and recently is in the upper chest ( pec) area on both sides. Pt reports anything overhead is giving him problem - tingling down the R arm. OBJECTIVE:         Observation: upper trap over activation , rounded shoulders, FAIR posture     Test measurements:     21    EAST test - + for parathesias in B hands after 20 seconds.  Vascular refill in B hands weekly     Access Code: [email or web address]   URL: [email or web address]   Date: 01/14/2021   Prepared by: López Herdsharon     Exercises   Seated Levator Scapulae Stretch - 10 reps - 3 sets - 1x daily - 7x weekly   Seated Cervical Sidebending Stretch - 10 reps - 3 sets - 1x daily - 7x weekly   Doorway Pec Stretch at 90 Degrees Abduction - 10 reps - 3 sets - 1x daily - 7x weekly   Standing Shoulder Posterior Capsule Stretch - 10 reps - 3 sets - 1x daily - 7x weekly       Access Code: [email or web address]   URL: [employer]/   Date: 01/18/2021   Prepared by: López Herder     Exercises   Sidelying Thoracic Rotation - 10 reps - 3 sets - 1x daily - 7x weekly   Median Nerve Flossing - Tray - 10 reps - 3 sets - 1x daily - 7x weekly   Standing Radial Nerve Glide - 10 reps - 3 sets - 1x daily - 7x weekly   Ulnar Nerve Flossing - 10 reps - 3 sets - 1x daily - 7x weekly     Access Code: [email or web address]   URL: [email or web address]   Date: 01/21/2021   Prepared by: López Herdsharon     Exercises   Supine Diaphragmatic Breathing - 10 reps - 3 sets - 1x daily - 7x weekly   Seated Thoracic Self-Mobilization - 10 reps - 3 sets - 1x daily - 7x weekly         Therapeutic Exercise and NMR EXR  [x] (75687) Provided verbal/tactile cueing for activities related to strengthening, flexibility, endurance, ROM  for improvements in cervical, postural, scapular, scapulothoracic and UE control with self care, reaching, carrying, lifting, house/yardwork, driving/computer work.    [] (24707) Provided verbal/tactile cueing for activities related to improving balance, coordination, kinesthetic sense, posture, motor skill, proprioception  to assist with cervical, scapular, scapulothoracic and UE control with self care, reaching, carrying, lifting, house/yardwork, driving/computer work.  [] (88293) Therapist is in constant attendance of 2 or more patients providing skilled therapy interventions, but not providing any significant amount of measurable one-on-one time to either patient, for improvements in cervical, scapular, scapulothoracic and UE control with self care, reaching, carrying, lifting, house/yardwork, driving, computer work. Therapeutic Activities:    [x] (68962 or 64128) Provided verbal/tactile cueing for activities related to improving balance, coordination, kinesthetic sense, posture, motor skill, proprioception and motor activation to allow for proper function of cervical, scapular, scapulothoracic and UE control with self care, carrying, lifting, driving/computer work.      Home Exercise Program:    [x] (05907) Reviewed/Progressed HEP activities related to strengthening, flexibility, endurance, ROM of cervical, scapular, scapulothoracic and UE control with self care, reaching, carrying, lifting, house/yardwork, driving/computer work  [] (40699) Reviewed/Progressed HEP activities related to improving balance, coordination, kinesthetic sense, posture, motor skill, proprioception of cervical, scapular, scapulothoracic and UE control with self care, reaching, carrying, lifting, house/yardwork, driving/computer work      Manual Treatments:  PROM / STM / Oscillations-Mobs:  G-I, II, III, IV (PA's, Inf., Post.)  [x] (83162) Provided manual therapy to mobilize soft tissue/joints of cervical/CT, scapular GHJ and UE for the purpose of decreasing headache, modulating pain, promoting relaxation,  increasing ROM, reducing/eliminating soft tissue swelling/inflammation/restriction, improving soft tissue extensibility and allowing for proper ROM for normal function with self care, reaching, carrying, lifting, house/yardwork, driving/computer work    Modalities:  I    Charges:  Timed Code Treatment Minutes: 45   Total Treatment Minutes: 45       [] EVAL - LOW (72322)   [] EVAL - MOD (30366)  [] EVAL - HIGH (18273)  [] RE-EVAL (85657)  [x] [place](74830) x 2        [] NMR (09215) x 3     [] Ionto  [] Manual (65508) x 1       [] Ultrasound  [x] TA x 1    [] Mech Traction (70771)  [] Home Management Training x   [] ES (un) (14982):           [] Aquatic    [] ES(attended) (55282)   [] Group    [] Other:    GOALS:  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 Independent in HEP and progression per patient tolerance, in order to prevent re-injury. [x] Progressing: [] Met: [] Not Met: [] Adjusted  2. Patient will have a decrease in pain to facilitate improvement in movement, function, and ADLs as indicated by Functional Deficits. [x] Progressing: [] Met: [] Not Met: [] Adjusted    Long Term Goals: To be achieved in: 6 weeks  1. Disability index score of 15% or less for the NDI to assist with reaching prior level of function. [x] Progressing: [] Met: [] Not Met: [] Adjusted  2. Patient will demonstrate increased AROM to [facility] of cervical/thoracic spine to allow for proper joint functioning as indicated by patients Functional Deficits. [x] Progressing: [] Met: [] Not Met: [] Adjusted  3. Patient will demonstrate an increase in postural awareness and control and activation of  Deep cervical stabilizers to allow for proper functional mobility as indicated by patients Functional Deficits. [x] Progressing: [] Met: [] Not Met: [] Adjusted  4. Patient will return to functional activities including work, lifting, without increased symptoms or restriction. [x] Progressing: [] Met: [] Not Met: [] Adjusted    Overall Progression Towards Functional goals/ Treatment Progress Update:  [] Patient is progressing as expected towards functional goals listed. [] Progression is slowed due to complexities/Impairments listed. [] Progression has been slowed due to co-morbidities.   [x] Plan just implemented, too soon to assess goals progression <30days   [] Goals require adjustment due to lack of progress  [] Patient is not progressing as expected and requires additional follow up with physician  [] Other    Persisting Functional Limitations/Impairments:  []Sitting []Standing   []Walking []Squatting/bending    []Stairs []ADL's    []Transfers []Reaching  [x]Housework [x]Lifting  []Driving [x]Job related tasks  [x]Sports/Recreation : disc golf  [x]Sleeping  []Other:    ASSESSMENT:   *Pt tolerance to any neck or UE movement is very low. Supine position utilize today to try and improve tolerance to stretching to improve postural, decrease TOS symptoms , supine overhead movements to try and improve tolerance after stretching. Pt with improved tolerance and no symptoms post visit. PT also assessed to see if 1st rib may be elevated/ contributing to symptoms. PT to try 1st rib mobilization at next visit. Treatment/Activity Tolerance:  [x] Patient able to complete tx  [] Patient limited by fatique  [x] Patient limited by pain  [] Patient limited by other medical complications  [] Other:     Prognosis: [x] Good [] Fair  [] Poor    Patient Requires Follow-up: [x] Yes  [] No    PLAN: See eval. PT 1-2x / week for 6 weeks. TOS, decrease upper trap recruitment, proper breathing, decrease neural tension. Pt insurance does not cover dry needling   [x] Continue per plan of care [] Alter current plan (see comments)  [] Plan of care initiated [] Hold pending MD visit [] Discharge    Electronically signed by: Krystal Escobar, PT       Note: If patient does not return for scheduled/ recommended follow up visits, this note will serve as a discharge from care along with most recent update on progress.

## 2021-02-04 ENCOUNTER — HOSPITAL ENCOUNTER (OUTPATIENT)
Dept: PHYSICAL THERAPY | Age: 46
Setting detail: THERAPIES SERIES
Discharge: HOME | End: 2021-02-04
Payer: MEDICARE

## 2021-02-04 ENCOUNTER — OFFICE VISIT (OUTPATIENT)
Dept: ORTHOPEDIC SURGERY | Age: 46
End: 2021-02-04
Payer: MEDICARE

## 2021-02-04 VITALS
WEIGHT: 159 LBS | HEIGHT: 69 IN | TEMPERATURE: 100.4 F | BODY MASS INDEX: 23.55 KG/M2 | HEART RATE: 86 BPM | DIASTOLIC BLOOD PRESSURE: 87 MMHG | SYSTOLIC BLOOD PRESSURE: 140 MMHG

## 2021-02-04 DIAGNOSIS — S16.1XXA STRAIN OF NECK MUSCLE, INITIAL ENCOUNTER: Primary | ICD-10-CM

## 2021-02-04 DIAGNOSIS — F17.200 CURRENT SMOKER: ICD-10-CM

## 2021-02-04 DIAGNOSIS — S46.912A STRAIN OF LEFT SHOULDER, INITIAL ENCOUNTER: ICD-10-CM

## 2021-02-04 PROCEDURE — G8427 DOCREV CUR MEDS BY ELIG CLIN: HCPCS | Performed by: ORTHOPAEDIC SURGERY

## 2021-02-04 PROCEDURE — G8420 CALC BMI NORM PARAMETERS: HCPCS | Performed by: ORTHOPAEDIC SURGERY

## 2021-02-04 PROCEDURE — 4004F PT TOBACCO SCREEN RCVD TLK: CPT | Performed by: ORTHOPAEDIC SURGERY

## 2021-02-04 PROCEDURE — 99214 OFFICE O/P EST MOD 30 MIN: CPT | Performed by: ORTHOPAEDIC SURGERY

## 2021-02-04 PROCEDURE — G8484 FLU IMMUNIZE NO ADMIN: HCPCS | Performed by: ORTHOPAEDIC SURGERY

## 2021-02-04 NOTE — PROGRESS NOTES
Physical Therapy  Cancellation/No-show Note  Patient Name:  Daysi Muniz  :  1975   Date:  2021  Cancelled visits to date: 0  No-shows to date: 0    Patient status for today's appointment patient:  []  Cancelled  [x]  Rescheduled appointment   []  No-show     Reason given by patient:  []  Patient ill  []  Conflicting appointment  []  No transportation    []  Conflict with work  []  No reason given  [x]  Other:  Pt was late to appt due to appt with MD upstairs going long. Pt would like to re-schedule visit.     Comments:      Phone call information:   []  Phone call made today to patient at _ time at number provided:      []  Patient answered, conversation as follows:    []  Patient did not answer, message left as follows:  [x]  Phone call not made today    Electronically signed by:  Delta Keane, PT

## 2021-02-05 RX ORDER — CYCLOBENZAPRINE HCL 5 MG
5 TABLET ORAL NIGHTLY PRN
Qty: 30 TABLET | Refills: 0 | Status: SHIPPED | OUTPATIENT
Start: 2021-02-05 | End: 2021-02-09 | Stop reason: SDUPTHER
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
## 2021-02-07 DIAGNOSIS — S16.1XXA STRAIN OF NECK MUSCLE, INITIAL ENCOUNTER: ICD-10-CM

## 2021-02-07 DIAGNOSIS — S46.912A STRAIN OF LEFT SHOULDER, INITIAL ENCOUNTER: ICD-10-CM

## 2021-02-08 ENCOUNTER — OFFICE VISIT (OUTPATIENT)
Dept: ORTHOPEDIC SURGERY | Age: 46
End: 2021-02-08
Payer: MEDICARE

## 2021-02-08 VITALS — BODY MASS INDEX: 23.55 KG/M2 | TEMPERATURE: 98.6 F | HEIGHT: 69 IN | WEIGHT: 159 LBS

## 2021-02-08 DIAGNOSIS — G89.29 CHRONIC RIGHT SHOULDER PAIN: ICD-10-CM

## 2021-02-08 DIAGNOSIS — M75.101 TEAR OF RIGHT ROTATOR CUFF, UNSPECIFIED TEAR EXTENT, UNSPECIFIED WHETHER TRAUMATIC: ICD-10-CM

## 2021-02-08 DIAGNOSIS — M54.12 CERVICAL RADICULAR PAIN: ICD-10-CM

## 2021-02-08 DIAGNOSIS — M25.511 CHRONIC RIGHT SHOULDER PAIN: ICD-10-CM

## 2021-02-08 DIAGNOSIS — M54.2 NECK PAIN: Primary | ICD-10-CM

## 2021-02-08 DIAGNOSIS — M50.30 DDD (DEGENERATIVE DISC DISEASE), CERVICAL: ICD-10-CM

## 2021-02-08 PROCEDURE — G8420 CALC BMI NORM PARAMETERS: HCPCS | Performed by: PHYSICIAN ASSISTANT

## 2021-02-08 PROCEDURE — 4004F PT TOBACCO SCREEN RCVD TLK: CPT | Performed by: PHYSICIAN ASSISTANT

## 2021-02-08 PROCEDURE — G8484 FLU IMMUNIZE NO ADMIN: HCPCS | Performed by: PHYSICIAN ASSISTANT

## 2021-02-08 PROCEDURE — G8427 DOCREV CUR MEDS BY ELIG CLIN: HCPCS | Performed by: PHYSICIAN ASSISTANT

## 2021-02-08 PROCEDURE — 99214 OFFICE O/P EST MOD 30 MIN: CPT | Performed by: PHYSICIAN ASSISTANT
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
## 2021-02-09 ENCOUNTER — OFFICE VISIT (OUTPATIENT)
Dept: ORTHOPEDIC SURGERY | Age: 46
End: 2021-02-09
Payer: MEDICARE

## 2021-02-09 VITALS
WEIGHT: 150 LBS | SYSTOLIC BLOOD PRESSURE: 138 MMHG | DIASTOLIC BLOOD PRESSURE: 96 MMHG | HEART RATE: 77 BPM | BODY MASS INDEX: 22.22 KG/M2 | TEMPERATURE: 98.6 F | HEIGHT: 69 IN

## 2021-02-09 DIAGNOSIS — S46.912A STRAIN OF LEFT SHOULDER, INITIAL ENCOUNTER: ICD-10-CM

## 2021-02-09 DIAGNOSIS — S16.1XXA STRAIN OF NECK MUSCLE, INITIAL ENCOUNTER: ICD-10-CM

## 2021-02-09 DIAGNOSIS — M16.0 BILATERAL HIP JOINT ARTHRITIS: ICD-10-CM

## 2021-02-09 DIAGNOSIS — M54.50 BILATERAL LOW BACK PAIN, UNSPECIFIED CHRONICITY, UNSPECIFIED WHETHER SCIATICA PRESENT: Primary | ICD-10-CM

## 2021-02-09 DIAGNOSIS — M47.816 LUMBAR FACET ARTHROPATHY: ICD-10-CM

## 2021-02-09 PROCEDURE — G8484 FLU IMMUNIZE NO ADMIN: HCPCS | Performed by: PHYSICIAN ASSISTANT

## 2021-02-09 PROCEDURE — 99214 OFFICE O/P EST MOD 30 MIN: CPT | Performed by: PHYSICIAN ASSISTANT

## 2021-02-09 PROCEDURE — 4004F PT TOBACCO SCREEN RCVD TLK: CPT | Performed by: PHYSICIAN ASSISTANT

## 2021-02-09 PROCEDURE — G8427 DOCREV CUR MEDS BY ELIG CLIN: HCPCS | Performed by: PHYSICIAN ASSISTANT

## 2021-02-09 PROCEDURE — G8420 CALC BMI NORM PARAMETERS: HCPCS | Performed by: PHYSICIAN ASSISTANT

## 2021-02-09 RX ORDER — CYCLOBENZAPRINE HCL 10 MG
10 TABLET ORAL 3 TIMES DAILY PRN
Qty: 90 TABLET | Refills: 2 | Status: SHIPPED | OUTPATIENT
Start: 2021-02-09 | End: 2021-03-11

## 2021-02-09 RX ORDER — MELOXICAM 7.5 MG/1
TABLET ORAL
Qty: 60 TABLET | Refills: 0 | Status: SHIPPED | OUTPATIENT
Start: 2021-02-09
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
## 2021-02-10 ENCOUNTER — HOSPITAL ENCOUNTER (OUTPATIENT)
Dept: PHYSICAL THERAPY | Age: 46
Setting detail: THERAPIES SERIES
Discharge: HOME | End: 2021-02-10
Payer: MEDICARE

## 2021-02-10 PROCEDURE — 97530 THERAPEUTIC ACTIVITIES: CPT

## 2021-02-10 NOTE — FLOWSHEET NOTE
[address] [facility]  Phone: [phone]   Fax: [phone]     Physical Therapy Re-Certification Plan of Care    Dear  , MIGUELITO Leiva    We had the pleasure of treating the following patient for physical therapy services at [facility]. A summary of our findings can be found in the updated assessment below. This includes our plan of care. If you have any questions or concerns regarding these findings, please do not hesitate to contact me at the office phone number checked above. Thank you for the referral.     Physician Signature:________________________________Date:__________________  By signing above (or electronic signature), therapists plan is approved by physician        Overall Response to Treatment:   []Patient is responding well to treatment and improvement is noted with regards  to goals   []Patient should continue to improve in reasonable time if they continue HEP   []Patient has plateaued and is no longer responding to skilled PT intervention    []Patient is getting worse and would benefit from return to referring MD   []Patient unable to adhere to initial POC   [x]Other: Lorena Tapia saw pt for follow up for cervical and LBP and wrote order to add to PT POC. Goals modified below and re-assessment completed today. PT to continue for 2x per week for 6 weeks for cervical        Recommendation:    [x]Continue PT 2x / wk for 6 weeks. []Hold PT, pending MD visit          Physical Therapy Treatment Note/ Progress Report:     Date:  2/10/2021    Patient Name:  Gabriela Mcmahan    :  1975  MRN: [id number]  Restrictions/Precautions:    Medical/Treatment Diagnosis Information:  Diagnosis: S16. 1XXA (ICD-10-CM) - Strain of neck muscle, initial encounter , P67.515P (ICD-10-CM) - Strain of left shoulder, initial encounter  Treatment Diagnosis: Impaired posture, increased muscle tension, decreased cervical ROM strength, BUE decreased strength  Insurance/Certification information:  PT Insurance Information: [place]  Physician Information:  Referring Practitioner: Aparna Saul MD , MIGUELITO Gonzalez  Plan of care signed (Y/N): [x]  Yes []  No     Date of Patient follow up with Physician:      Progress Report: [x]  Yes  []  No     Date Range for reporting period:  Beginnin21  Endin21    Progress report due (10 Rx/or 30 days whichever is less):       Recertification due (POC duration/ or 90 days whichever is less): visit #12 or 3/24/21    Visit # Insurance Allowable Auth required? Date Range    30 []  Yes  [x]  No      Latex Allergy:  [x]NO      []YES  Preferred Language for Healthcare:   [x]English       []other:    Functional Scale:         Date assessed:  NDI: raw score = 27; dysfunction = 54%    21    Pain level:  3/10 mid back , R shoulder , neck , 3/10 low back      SUBJECTIVE:   Pt Nelsonkaila Celestinela wrote orders to add low back/ hip to PT program. Pt reports low back pain and hip pain is mild today, states he cannot walk long distances without stopped due to hip pain. Feels like he shifts his weight a lot to get pressure off hips. OBJECTIVE:     2/10: Lumbar ROM - 120 flexion, 40 ext, SB 30 B , rotation ~10% limited bilaterally , B STRENGTH: hip flexion - 3+/5, hip abd 3+/5, hip ext 3+/5 , knee ext 4/5, knee flexion 4/5    Special tests: LUZ MARINA + on R , - standing flexion test, - for SI involvement, normal lumbar joint mobility , mild hamstring tightness B          Observation: upper trap over activation , rounded shoulders, FAIR posture     Test measurements:     21    EAST test - + for parathesias in B hands after 20 seconds.  Vascular refill in B hands - normal     Dermatomes Normal Abnormal Comments   Top of head (C1)         Posterior occipital region (C2)         Side of neck (C3)         Top of shoulder (C4) x       Lateral deltoid (C5) x       Tip of thumb (C6) x     Distal middle finger (C7) x       Distal fifth finger (C8) x       Medial forearm (T1) x       Lower extremity               Reflexes Normal Abnormal Comments   C5-6 Biceps x       C5-6 Brachioradialis x       C7-8 Triceps x       Linaress x       S1-2 Seated achilles x        S1-2 Prone knee bend         L3-4 Patellar tendon x        Clonus x        Babinski               CERV ROM L R L 1/25 R 1/25   Cervical Flexion 35   35   Cervical Extension 42   40   Cervical SB 25 27 25 25   Cervical rotation 50 55 45 45            ROM Left Right L 1/25 R 1/25   Shoulder Flex WNL WNL WNL WNL   Shoulder Abd WNL WNL WNL WNL   Shoulder ER C2 C2     Shoulder IR T8 L4                       Strength / Myotomes Left Right L 1/25 R 1/25   Cervical Flexion (C1-2)        Cervical Side-bending (C3)        Shoulder Shrug (C4)        Shoulder Flex 4 4 4 4   Shoulder Scap        Shoulder Abduction (C5) 4 4 4 4   Shoulder ER 4- 4- 4- 4-   Shoulder IR 4 4 4 4   Biceps (C6) 4 4 4 4   Triceps (C7) 4 4 4 4   Wrist Extension (C6)        Wrist Flexion (C7)                 Thumb Abduction (C8)        Finger Abduction (T1)           Lower extremity myotomes:   [x]? Normal                      []?Abnormal      Joint mobility:  mid thoracic               []?Normal                      [x]? Hypo              []?Hyper     Orthopaedic Special Tests  Positive  Negative  NT Comments    Christiane's            Rhomberg           Sharps-Darryl   x       Cervical Torsion / Body Rotation    x       C2 Kick   x        Modified Shear           Compression   x       Distraction    x                         RESTRICTIONS/PRECAUTIONS:     Exercises/Interventions:   Therapeutic Exercise (09949) or [id number] Resistance / level Sets/sec Reps Notes   UBE 1.0   Painful - stopped    pulleys        Chin tucks  5''x10     Upper trap stretch  2x30''     Post shoulder stretch   2x30''     Doorway pec stretch   6x30''  Hands high x3  Hands low x3    Standing thread the needle S/L open book      TB:   Mid row  Ext  tricep ext  LPD   LIME   x20  x20  x20  x20     No money ER       Serratus push up        Chin tuck with BUE flexion to 90   5''x10     Thoracic ext sitting at chair   5''X10  Towel at T6   Sitting cervical AROM SB   x20  Pt Hands stabilizing C5-7   Supine flexion AROM  x10     Supine Foam roll chest openers stretch - hands low  UE at 90 90   2x30''    2x30''     Supine scalene stretch   3x30'' B                                        Therapeutic Activities (02026)       diaphramic breathing in supine     Min cues to perform 1/25    progress note/reassessment     1/25   Re-assessment for LBP/HIPS X30 MIN    2/10                                                    NMR re-education (17541)       Prone chin tucks       Prone scap retraction              Postural strengthening               Median Nerve flossing   Ulnar nerve flossing                             Manual Intervention (90006)       STM to upper trap, SOR, gentle distraction to cervical spine, pain free PROM                                              Patient education:  -pt educated on diagnosis, prognosis and expectations for rehab  -all pt questions were answered    Home Exercise Program:  Access Code: [email or web address]   URL: [employer]/   Date: 01/04/2021   Prepared by: Nguyễn Orozco     Exercises   Seated Scapular Retraction - 10 reps - 3 sets - 1x daily - 7x weekly   Seated Cervical Retraction - 10 reps - 3 sets - 1x daily - 7x weekly   Standing Isometric Cervical Flexion with Manual Resistance - 10 reps - 3 sets - 1x daily - 7x weekly   Standing Isometric Cervical Extension with Manual Resistance - 10 reps - 3 sets - 1x daily - 7x weekly   Seated Isometric Cervical Sidebending - 10 reps - 3 sets - 1x daily - 7x weekly     Access Code: [email or web address]   URL: [employer]/   Date: 01/14/2021   Prepared by: Nguyễn Orozco     Exercises   Seated Levator Scapulae Stretch - 10 reps - 3 sets - 1x daily - driving/computer work.  [] (11639) Therapist is in constant attendance of 2 or more patients providing skilled therapy interventions, but not providing any significant amount of measurable one-on-one time to either patient, for improvements in cervical, scapular, scapulothoracic and UE control with self care, reaching, carrying, lifting, house/yardwork, driving, computer work. Therapeutic Activities:    [x] (99312 or 25266) Provided verbal/tactile cueing for activities related to improving balance, coordination, kinesthetic sense, posture, motor skill, proprioception and motor activation to allow for proper function of cervical, scapular, scapulothoracic and UE control with self care, carrying, lifting, driving/computer work.      Home Exercise Program:    [x] (37918) Reviewed/Progressed HEP activities related to strengthening, flexibility, endurance, ROM of cervical, scapular, scapulothoracic and UE control with self care, reaching, carrying, lifting, house/yardwork, driving/computer work  [] (63970) Reviewed/Progressed HEP activities related to improving balance, coordination, kinesthetic sense, posture, motor skill, proprioception of cervical, scapular, scapulothoracic and UE control with self care, reaching, carrying, lifting, house/yardwork, driving/computer work      Manual Treatments:  PROM / STM / Oscillations-Mobs:  G-I, II, III, IV (PA's, Inf., Post.)  [x] (14714) Provided manual therapy to mobilize soft tissue/joints of cervical/CT, scapular GHJ and UE for the purpose of decreasing headache, modulating pain, promoting relaxation,  increasing ROM, reducing/eliminating soft tissue swelling/inflammation/restriction, improving soft tissue extensibility and allowing for proper ROM for normal function with self care, reaching, carrying, lifting, house/yardwork, driving/computer work    Modalities:  I    Charges:  Timed Code Treatment Minutes: 30   Total Treatment Minutes: 30       [] EVAL - LOW (71797)   [] less on JUAN PABLO. [x] Progressing: [] Met: [] Not Met: [] Adjusted    Overall Progression Towards Functional goals/ Treatment Progress Update:  [] Patient is progressing as expected towards functional goals listed. [x] Progression is slowed due to complexities/Impairments listed. [] Progression has been slowed due to co-morbidities. [] Plan just implemented, too soon to assess goals progression <30days   [] Goals require adjustment due to lack of progress  [] Patient is not progressing as expected and requires additional follow up with physician  [] Other    Persisting Functional Limitations/Impairments:  []Sitting []Standing   []Walking []Squatting/bending    []Stairs []ADL's    []Transfers []Reaching  [x]Housework [x]Lifting  []Driving [x]Job related tasks  [x]Sports/Recreation : disc golf  [x]Sleeping  []Other:    ASSESSMENT:   SEE ABOVE    Treatment/Activity Tolerance:  [x] Patient able to complete tx  [] Patient limited by fatique  [x] Patient limited by pain  [] Patient limited by other medical complications  [] Other:     Prognosis: [x] Good [] Fair  [] Poor    Patient Requires Follow-up: [x] Yes  [] No    PLAN: See eval. PT 1-2x / week for 6 weeks. TOS, decrease upper trap recruitment, proper breathing, decrease neural tension. Pt insurance does not cover dry needling . 2/10 - ADD HIP STRENGTHENING , LUMBAR STABILITY , CORE STRENGTH   [x] Continue per plan of care [] Alter current plan (see comments)  [] Plan of care initiated [] Hold pending MD visit [] Discharge    Electronically signed by: Thom Saul PT       Note: If patient does not return for scheduled/ recommended follow up visits, this note will serve as a discharge from care along with most recent update on progress.

## 2021-02-12 ENCOUNTER — HOSPITAL ENCOUNTER (OUTPATIENT)
Dept: PHYSICAL THERAPY | Age: 46
Setting detail: THERAPIES SERIES
Discharge: HOME | End: 2021-02-12
Payer: MEDICARE

## 2021-02-12 ENCOUNTER — TELEPHONE (OUTPATIENT)
Dept: ORTHOPEDIC SURGERY | Age: 46
End: 2021-02-12

## 2021-02-12 DIAGNOSIS — Z98.890 HISTORY OF METAL REMOVED FROM EYE: Primary | ICD-10-CM

## 2021-02-12 PROCEDURE — 97112 NEUROMUSCULAR REEDUCATION: CPT

## 2021-02-12 PROCEDURE — 97110 THERAPEUTIC EXERCISES: CPT

## 2021-02-12 NOTE — FLOWSHEET NOTE
[address] [facility] Physical Therapy  Phone: [phone]   Fax: [phone]        Physical Therapy Treatment Note/ Progress Report:     Date:  2021    Patient Name:  Oscar Harris    :  1975  MRN: [id number]  Restrictions/Precautions:    Medical/Treatment Diagnosis Information:  Diagnosis: S16. 1XXA (ICD-10-CM) - Strain of neck muscle, initial encounter , L24.649M (ICD-10-CM) - Strain of left shoulder, initial encounter, M16.0 (ICD-10-CM) - Bilateral hip joint arthritis  Treatment Diagnosis: Impaired posture, increased muscle tension, decreased cervical ROM strength, BUE decreased strength, decrease hip/core strength, low back pain. Insurance/Certification information:  PT Insurance Information: [employer]  Physician Information:  Referring Practitioner: Keira Kidd MD , MIGUELITO Guy  Plan of care signed (Y/N): [x]  Yes []  No     Date of Patient follow up with Physician:      Progress Report: [x]  Yes  []  No     Date Range for reporting period:  Beginnin21  Endin21    Progress report due (10 Rx/or 30 days whichever is less):       Recertification due (POC duration/ or 90 days whichever is less):  3/24/21    Visit # Insurance Allowable Auth required? Date Range   10 30 []  Yes  [x]  No      Latex Allergy:  [x]NO      []YES  Preferred Language for Healthcare:   [x]English       []other:    Functional Scale:         Date assessed:  NDI: raw score = 27; dysfunction = 54%    21    Pain level:  3/10 mid back , R shoulder , neck , 3/10 low back      SUBJECTIVE:   Pt reports he is working a side job right now and is doing a lot of walking and is sore.      OBJECTIVE:     2/10: Lumbar ROM - 120 flexion, 40 ext, SB 30 B , rotation ~10% limited bilaterally , B STRENGTH: hip flexion - 3+/5, hip abd 3+/5, hip ext 3+/5 , knee ext 4/5, knee flexion 4/5    Special tests: LUZ MARINA + on R , - standing flexion test, - for SI involvement, normal lumbar joint mobility , mild hamstring tightness B      1/25    Observation: upper trap over activation , rounded shoulders, FAIR posture     Test measurements:     1/25/21    EAST test - + for parathesias in B hands after 20 seconds. Vascular refill in B hands - normal     Dermatomes Normal Abnormal Comments   Top of head (C1)         Posterior occipital region (C2)         Side of neck (C3)         Top of shoulder (C4) x       Lateral deltoid (C5) x       Tip of thumb (C6) x       Distal middle finger (C7) x       Distal fifth finger (C8) x       Medial forearm (T1) x       Lower extremity               Reflexes Normal Abnormal Comments   C5-6 Biceps x       C5-6 Brachioradialis x       C7-8 Triceps x       Linaress x       S1-2 Seated achilles x        S1-2 Prone knee bend         L3-4 Patellar tendon x        Clonus x        Babinski               CERV ROM L R L 1/25 R 1/25   Cervical Flexion 35   35   Cervical Extension 42   40   Cervical SB 25 27 25 25   Cervical rotation 50 55 45 45            ROM Left Right L 1/25 R 1/25   Shoulder Flex WNL WNL WNL WNL   Shoulder Abd WNL WNL WNL WNL   Shoulder ER C2 C2     Shoulder IR T8 L4                       Strength / Myotomes Left Right L 1/25 R 1/25   Cervical Flexion (C1-2)        Cervical Side-bending (C3)        Shoulder Shrug (C4)        Shoulder Flex 4 4 4 4   Shoulder Scap        Shoulder Abduction (C5) 4 4 4 4   Shoulder ER 4- 4- 4- 4-   Shoulder IR 4 4 4 4   Biceps (C6) 4 4 4 4   Triceps (C7) 4 4 4 4   Wrist Extension (C6)        Wrist Flexion (C7)                 Thumb Abduction (C8)        Finger Abduction (T1)           Lower extremity myotomes:   [x]? Normal                      []?Abnormal      Joint mobility:  mid thoracic               []?Normal                      [x]? Hypo              []?Hyper     Orthopaedic Special Tests  Positive  Negative  NT Comments    Hautard's            Rhomberg           Sharps-Darryl   x       Cervical Torsion / Body Rotation    x     C2 Kick   x        Modified Shear           Compression   x       Distraction    x                         RESTRICTIONS/PRECAUTIONS:     Exercises/Interventions:   Therapeutic Exercise (53675) or [id number] Resistance / level Sets/sec Reps Notes   UBE 1.0   Painful - stopped    pulleys        Chin tucks  5''x10     Upper trap stretch  2x30''     Post shoulder stretch   2x30''     Doorway pec stretch   6x30''  Hands high x3  Hands low x3    Standing thread the needle       S/L open book      TB:   Mid row  Ext  tricep ext  LPD   LIME   x20  x20  x20  x20     No money ER       Serratus push up        Chin tuck with BUE flexion to 90   5''x10     Thoracic ext sitting at chair   5''X10  Towel at T6   Sitting cervical AROM SB   x20  Pt Hands stabilizing C5-7   Supine flexion AROM  x10     Supine Foam roll chest openers stretch - hands low  UE at 90 90   2x30''    2x30''     Supine scalene stretch   3x30'' B     Bridge   5''x10     SLR flexion  abd  x10 B  x10 B     IB S  2x30''     HSS at EOT  2x30''                                        Therapeutic Activities (04106)       diaphramic breathing in supine     Min cues to perform 1/25    progress note/reassessment     1/25   Re-assessment for LBP/HIPS    2/10                                                                                NMR re-education (86468)       Prone chin tucks       Prone scap retraction              Postural strengthening               Median Nerve flossing   Ulnar nerve flossing               TrA bracing in supine  10''x10     TrA with PPT  10''x10                                        Manual Intervention (52032)       STM to upper trap, SOR, gentle distraction to cervical spine, pain free PROM                                              Patient education:  -pt educated on diagnosis, prognosis and expectations for rehab  -all pt questions were answered    Home Exercise Program:  Access Code: [email or web address]   URL: [email or web address]   Date: Hamstring Stretch - 10 reps - 3 sets - 1x daily - 7x weekly           Therapeutic Exercise and NMR EXR  [x] (97706) Provided verbal/tactile cueing for activities related to strengthening, flexibility, endurance, ROM  for improvements in cervical, postural, scapular, scapulothoracic and UE control with self care, reaching, carrying, lifting, house/yardwork, driving/computer work.    [] (23763) Provided verbal/tactile cueing for activities related to improving balance, coordination, kinesthetic sense, posture, motor skill, proprioception  to assist with cervical, scapular, scapulothoracic and UE control with self care, reaching, carrying, lifting, house/yardwork, driving/computer work.  [] (02451) Therapist is in constant attendance of 2 or more patients providing skilled therapy interventions, but not providing any significant amount of measurable one-on-one time to either patient, for improvements in cervical, scapular, scapulothoracic and UE control with self care, reaching, carrying, lifting, house/yardwork, driving, computer work. Therapeutic Activities:    [x] (79151 or 50528) Provided verbal/tactile cueing for activities related to improving balance, coordination, kinesthetic sense, posture, motor skill, proprioception and motor activation to allow for proper function of cervical, scapular, scapulothoracic and UE control with self care, carrying, lifting, driving/computer work.      Home Exercise Program:    [x] (17639) Reviewed/Progressed HEP activities related to strengthening, flexibility, endurance, ROM of cervical, scapular, scapulothoracic and UE control with self care, reaching, carrying, lifting, house/yardwork, driving/computer work  [] (90611) Reviewed/Progressed HEP activities related to improving balance, coordination, kinesthetic sense, posture, motor skill, proprioception of cervical, scapular, scapulothoracic and UE control with self care, reaching, carrying, lifting, house/yardwork, driving/computer work      Manual Treatments:  PROM / STM / Oscillations-Mobs:  G-I, II, III, IV (PA's, Inf., Post.)  [x] (64013) Provided manual therapy to mobilize soft tissue/joints of cervical/CT, scapular GHJ and UE for the purpose of decreasing headache, modulating pain, promoting relaxation,  increasing ROM, reducing/eliminating soft tissue swelling/inflammation/restriction, improving soft tissue extensibility and allowing for proper ROM for normal function with self care, reaching, carrying, lifting, house/yardwork, driving/computer work    Modalities:  I    Charges:  Timed Code Treatment Minutes: 40   Total Treatment Minutes: 40       [] EVAL - LOW (22639)   [] EVAL - MOD (97477)  [] EVAL - HIGH (97180)  [] RE-EVAL (09934)  [x] QW(28922) x 2        [x] NMR (25992) x 1     [] Ionto  [] Manual (08283) x 1       [] Ultrasound  [] TA x 2    [] Mech Traction (64103)  [] Home Management Training x   [] ES (un) (65785):           [] Aquatic    [] ES(attended) (68157)   [] Group    [] Other:    GOALS:  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 Independent in HEP and progression per patient tolerance, in order to prevent re-injury. [x] Progressing: [] Met: [] Not Met: [] Adjusted  2. Patient will have a decrease in pain to facilitate improvement in movement, function, and ADLs as indicated by Functional Deficits. [x] Progressing: [] Met: [] Not Met: [] Adjusted    Long Term Goals: To be achieved in: 6 weeks  1. Disability index score of 15% or less for the NDI to assist with reaching prior level of function. [x] Progressing: [] Met: [] Not Met: [] Adjusted  2. Patient will demonstrate increased AROM to [facility] of cervical/thoracic spine to allow for proper joint functioning as indicated by patients Functional Deficits.    [x] Progressing: [] Met: [] Not Met: [] Adjusted  3. Patient will demonstrate an increase in postural awareness and control and activation of  Deep cervical stabilizers to allow for proper functional mobility as indicated by patients Functional Deficits. [x] Progressing: [] Met: [] Not Met: [] Adjusted  4. Patient will return to functional activities including work, lifting, without increased symptoms or restriction. [x] Progressing: [] Met: [] Not Met: [] Adjusted   5. Patient will improve B hip strength to 4+/5 in order to decrease pain and improve function. [x] Progressing: [] Met: [] Not Met: [] Adjusted  6. Patient will improve core strength and mobility for improved function by scoring 20% or less on JUAN PABLO. [x] Progressing: [] Met: [] Not Met: [] Adjusted    Overall Progression Towards Functional goals/ Treatment Progress Update:  [] Patient is progressing as expected towards functional goals listed. [x] Progression is slowed due to complexities/Impairments listed. [] Progression has been slowed due to co-morbidities. [] Plan just implemented, too soon to assess goals progression <30days   [] Goals require adjustment due to lack of progress  [] Patient is not progressing as expected and requires additional follow up with physician  [] Other    Persisting Functional Limitations/Impairments:  []Sitting []Standing   []Walking []Squatting/bending    []Stairs []ADL's    []Transfers []Reaching  [x]Housework [x]Lifting  []Driving [x]Job related tasks  [x]Sports/Recreation : disc golf  [x]Sleeping  []Other:    ASSESSMENT:    Pt only able to tolerate reps of 10 today, fatigues quickly with hip and TrA exercises. See above for progressions.      Treatment/Activity Tolerance:  [x] Patient able to complete tx  [] Patient limited by fatique  [x] Patient limited by pain  [] Patient limited by other medical complications  [] Other:     Prognosis: [x] Good [] Fair  [] Poor    Patient Requires Follow-up: [x] Yes  [] No    PLAN: See larisa. PT 1-2x / week for 6 weeks. TOS, decrease upper trap recruitment, proper breathing, decrease neural tension. Pt insurance does not cover dry needling . 2/10 - ADD HIP STRENGTHENING , LUMBAR STABILITY , CORE STRENGTH . If pt does not progress - aquatic therapy? [x] Continue per plan of care [] Alter current plan (see comments)  [] Plan of care initiated [] Hold pending MD visit [] Discharge    Electronically signed by: Tarsha Garland, PT       Note: If patient does not return for scheduled/ recommended follow up visits, this note will serve as a discharge from care along with most recent update on progress.

## 2021-02-12 NOTE — TELEPHONE ENCOUNTER
General Question     Subject: PATIENT WENT FOR MRI, STATES HE'S HAD METAL FRAGMENTS IN HIS EYE SO HE NEEDS AN XRAY DONE  Patient and /or Facility Request: PT  Contact Number:[phone]

## 2021-02-17 ENCOUNTER — HOSPITAL ENCOUNTER (OUTPATIENT)
Dept: GENERAL RADIOLOGY | Age: 46
Discharge: HOME | End: 2021-02-17
Payer: MEDICARE

## 2021-02-17 ENCOUNTER — HOSPITAL ENCOUNTER (OUTPATIENT)
Dept: PHYSICAL THERAPY | Age: 46
Setting detail: THERAPIES SERIES
Discharge: HOME | End: 2021-02-17
Payer: MEDICARE

## 2021-02-17 ENCOUNTER — HOSPITAL ENCOUNTER (OUTPATIENT)
Age: 46
Discharge: HOME | End: 2021-02-17
Payer: MEDICARE

## 2021-02-17 DIAGNOSIS — Z98.890 HISTORY OF METAL REMOVED FROM EYE: ICD-10-CM

## 2021-02-17 PROCEDURE — 97110 THERAPEUTIC EXERCISES: CPT

## 2021-02-17 PROCEDURE — 70030 X-RAY EYE FOR FOREIGN BODY: CPT

## 2021-02-17 PROCEDURE — 97112 NEUROMUSCULAR REEDUCATION: CPT

## 2021-02-17 NOTE — FLOWSHEET NOTE
[address] [facility]  Phone: [phone]   Fax: [phone]        Physical Therapy Treatment Note/ Progress Report:     Date:  2021    Patient Name:  James Deluca    :  1975  MRN: [id number]  Restrictions/Precautions:    Medical/Treatment Diagnosis Information:  Diagnosis: S16. 1XXA (ICD-10-CM) - Strain of neck muscle, initial encounter , C07.518U (ICD-10-CM) - Strain of left shoulder, initial encounter, M16.0 (ICD-10-CM) - Bilateral hip joint arthritis  Treatment Diagnosis: Impaired posture, increased muscle tension, decreased cervical ROM strength, BUE decreased strength, decrease hip/core strength, low back pain. Insurance/Certification information:  PT Insurance Information: Pimento  Physician Information:  Referring Practitioner: Ramone Barfield MD , MIGUELITO Potter  Plan of care signed (Y/N): [x]  Yes []  No     Date of Patient follow up with Physician:      Progress Report: [x]  Yes  []  No     Date Range for reporting period:  Beginnin21  Endin21    Progress report due (10 Rx/or 30 days whichever is less):       Recertification due (POC duration/ or 90 days whichever is less):  3/24/21    Visit # Insurance Allowable Auth required? Date Range    []  Yes  [x]  No      Latex Allergy:  [x]NO      []YES  Preferred Language for Healthcare:   [x]English       []other:    Functional Scale:         Date assessed:  NDI: raw score = 27; dysfunction = 54%    21    Pain level:  3/10 mid back , R shoulder , neck , 3/10 low back      SUBJECTIVE:    Pt reports he has metal fragments in his eye and may not be able to get MRI. He is getting orbital XRAY soon to check.      OBJECTIVE:     2/10: Lumbar ROM - 120 flexion, 40 ext, SB 30 B , rotation ~10% limited bilaterally , B STRENGTH: hip flexion - 3+/5, hip abd 3+/5, hip ext 3+/5 , knee ext 4/5, knee flexion 4/5    Special tests: LUZ MARINA + on R , - standing flexion test, - for SI involvement, normal lumbar joint mobility , mild hamstring tightness B      1/25    Observation: upper trap over activation , rounded shoulders, FAIR posture     Test measurements:     1/25/21    EAST test - + for parathesias in B hands after 20 seconds. Vascular refill in B hands - normal     Dermatomes Normal Abnormal Comments   Top of head (C1)         Posterior occipital region (C2)         Side of neck (C3)         Top of shoulder (C4) x       Lateral deltoid (C5) x       Tip of thumb (C6) x       Distal middle finger (C7) x       Distal fifth finger (C8) x       Medial forearm (T1) x       Lower extremity               Reflexes Normal Abnormal Comments   C5-6 Biceps x       C5-6 Brachioradialis x       C7-8 Triceps x       Linaress x       S1-2 Seated achilles x        S1-2 Prone knee bend         L3-4 Patellar tendon x        Clonus x        Babinski               CERV ROM L R L 1/25 R 1/25   Cervical Flexion 35   35   Cervical Extension 42   40   Cervical SB 25 27 25 25   Cervical rotation 50 55 45 45            ROM Left Right L 1/25 R 1/25   Shoulder Flex WNL WNL WNL WNL   Shoulder Abd WNL WNL WNL WNL   Shoulder ER C2 C2     Shoulder IR T8 L4                       Strength / Myotomes Left Right L 1/25 R 1/25   Cervical Flexion (C1-2)        Cervical Side-bending (C3)        Shoulder Shrug (C4)        Shoulder Flex 4 4 4 4   Shoulder Scap        Shoulder Abduction (C5) 4 4 4 4   Shoulder ER 4- 4- 4- 4-   Shoulder IR 4 4 4 4   Biceps (C6) 4 4 4 4   Triceps (C7) 4 4 4 4   Wrist Extension (C6)        Wrist Flexion (C7)                 Thumb Abduction (C8)        Finger Abduction (T1)           Lower extremity myotomes:   [x]? Normal                      []?Abnormal      Joint mobility:  mid thoracic               []?Normal                      [x]? Hypo              []?Hyper     Orthopaedic Special Tests  Positive  Negative  NT Comments    Christiane's            Rhomberg           Sharps-Darryl   x     Cervical Torsion / Body Rotation    x       C2 Kick   x        Modified Shear           Compression   x       Distraction    x                         RESTRICTIONS/PRECAUTIONS:     Exercises/Interventions:   Therapeutic Exercise (15601) or [id number] Resistance / level Sets/sec Reps Notes   UBE 1.0   Painful - stopped    pulleys        Chin tucks  5''x10     Upper trap stretch  2x30''     Post shoulder stretch   2x30''     Doorway pec stretch   6x30''  Hands high x3  Hands low x3    Standing thread the needle       S/L open book      TB:   Mid row  Ext  tricep ext  LPD   LIME   x20  x20  x20  x20     No money ER       Serratus push up        Chin tuck with BUE flexion to 90   5''x10     Thoracic ext sitting at chair   5''X10  Towel at T6   Sitting cervical AROM SB   x20  Pt Hands stabilizing C5-7   Supine flexion AROM  x10     Supine Foam roll chest openers stretch - hands low  UE at 90 90   2x30''    2x30''     Supine scalene stretch   3x30'' B     Bridge   5''x10     SLR flexion  abd  x10 B  x10 B     IB S  2x30''     HSS at EOT  2x30''                                                             Therapeutic Activities (08510)       diaphramic breathing in supine     Min cues to perform 1/25    progress note/reassessment     1/25   Re-assessment for LBP/HIPS    2/10                                                                                NMR re-education (64102)       Prone chin tucks       Prone scap retraction              Postural strengthening               Median Nerve flossing   Ulnar nerve flossing               TrA bracing in supine  10''x10     TrA with PPT  10''x10     Supine head lifts   Side lying head lifts  10''x10  10''x10 B                                                      Manual Intervention (43899)       STM to upper trap, SOR, gentle distraction to cervical spine, pain free PROM                                              Patient education:  -pt educated on diagnosis, prognosis and expectations for rehab  -all pt questions were answered    Home Exercise Program:  Access Code: [email or web address]   URL: [employer]/   Date: 01/04/2021   Prepared by: Karuna Glimpse     Exercises   Seated Scapular Retraction - 10 reps - 3 sets - 1x daily - 7x weekly   Seated Cervical Retraction - 10 reps - 3 sets - 1x daily - 7x weekly   Standing Isometric Cervical Flexion with Manual Resistance - 10 reps - 3 sets - 1x daily - 7x weekly   Standing Isometric Cervical Extension with Manual Resistance - 10 reps - 3 sets - 1x daily - 7x weekly   Seated Isometric Cervical Sidebending - 10 reps - 3 sets - 1x daily - 7x weekly     Access Code: [email or web address]   URL: [employer]/   Date: 01/14/2021   Prepared by: Karuna Glimpse     Exercises   Seated Levator Scapulae Stretch - 10 reps - 3 sets - 1x daily - 7x weekly   Seated Cervical Sidebending Stretch - 10 reps - 3 sets - 1x daily - 7x weekly   Doorway Pec Stretch at 90 Degrees Abduction - 10 reps - 3 sets - 1x daily - 7x weekly   Standing Shoulder Posterior Capsule Stretch - 10 reps - 3 sets - 1x daily - 7x weekly       Access Code: [email or web address]   URL: [employer]/   Date: 01/18/2021   Prepared by: Karuna Glimpse     Exercises   Sidelying Thoracic Rotation - 10 reps - 3 sets - 1x daily - 7x weekly   Median Nerve Flossing - Tray - 10 reps - 3 sets - 1x daily - 7x weekly   Standing Radial Nerve Glide - 10 reps - 3 sets - 1x daily - 7x weekly   Ulnar Nerve Flossing - 10 reps - 3 sets - 1x daily - 7x weekly     Access Code: [email or web address]   URL: [email or web address]   Date: 01/21/2021   Prepared by: Karuna Glimpse     Exercises   Supine Diaphragmatic Breathing - 10 reps - 3 sets - 1x daily - 7x weekly   Seated Thoracic Self-Mobilization - 10 reps - 3 sets - 1x daily - 7x weekly     Access Code: [email or web address]   URL: [employer]/   Date: 02/10/2021   Prepared by: Karuna Glimpse     Exercises   Supine Bridge - 10 reps - 3 sets - 1x daily - 7x weekly Supine Active Straight Leg Raise - 10 reps - 3 sets - 1x daily - 7x weekly   Sidelying Hip Abduction - 10 reps - 3 sets - 1x daily - 7x weekly   Seated Hamstring Stretch - 10 reps - 3 sets - 1x daily - 7x weekly           Therapeutic Exercise and NMR EXR  [x] (32803) Provided verbal/tactile cueing for activities related to strengthening, flexibility, endurance, ROM  for improvements in cervical, postural, scapular, scapulothoracic and UE control with self care, reaching, carrying, lifting, house/yardwork, driving/computer work.    [] (40257) Provided verbal/tactile cueing for activities related to improving balance, coordination, kinesthetic sense, posture, motor skill, proprioception  to assist with cervical, scapular, scapulothoracic and UE control with self care, reaching, carrying, lifting, house/yardwork, driving/computer work.  [] (31354) Therapist is in constant attendance of 2 or more patients providing skilled therapy interventions, but not providing any significant amount of measurable one-on-one time to either patient, for improvements in cervical, scapular, scapulothoracic and UE control with self care, reaching, carrying, lifting, house/yardwork, driving, computer work. Therapeutic Activities:    [x] (71653 or 34905) Provided verbal/tactile cueing for activities related to improving balance, coordination, kinesthetic sense, posture, motor skill, proprioception and motor activation to allow for proper function of cervical, scapular, scapulothoracic and UE control with self care, carrying, lifting, driving/computer work.      Home Exercise Program:    [x] (31100) Reviewed/Progressed HEP activities related to strengthening, flexibility, endurance, ROM of cervical, scapular, scapulothoracic and UE control with self care, reaching, carrying, lifting, house/yardwork, driving/computer work  [] (80049) Reviewed/Progressed HEP activities related to improving balance, coordination, kinesthetic sense, posture, motor skill, proprioception of cervical, scapular, scapulothoracic and UE control with self care, reaching, carrying, lifting, house/yardwork, driving/computer work      Manual Treatments:  PROM / STM / Oscillations-Mobs:  G-I, II, III, IV (PA's, Inf., Post.)  [x] (25524) Provided manual therapy to mobilize soft tissue/joints of cervical/CT, scapular GHJ and UE for the purpose of decreasing headache, modulating pain, promoting relaxation,  increasing ROM, reducing/eliminating soft tissue swelling/inflammation/restriction, improving soft tissue extensibility and allowing for proper ROM for normal function with self care, reaching, carrying, lifting, house/yardwork, driving/computer work    Modalities:  I    Charges:  Timed Code Treatment Minutes: 45   Total Treatment Minutes: 45       [] EVAL - LOW (86826)   [] EVAL - MOD (39160)  [] EVAL - HIGH (59659)  [] RE-EVAL (94293)  [x] XA(61958) x 2        [x] NMR (61521) x 1     [] Ionto  [] Manual (85638) x 1       [] Ultrasound  [] TA x 2    [] Mech Traction (03897)  [] Home Management Training x   [] ES (un) (32446):           [] Aquatic    [] ES(attended) (63574)   [] Group    [] Other:    GOALS:  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 Independent in HEP and progression per patient tolerance, in order to prevent re-injury. [x] Progressing: [] Met: [] Not Met: [] Adjusted  2. Patient will have a decrease in pain to facilitate improvement in movement, function, and ADLs as indicated by Functional Deficits. [x] Progressing: [] Met: [] Not Met: [] Adjusted    Long Term Goals: To be achieved in: 6 weeks  1. Disability index score of 15% or less for the NDI to assist with reaching prior level of function. [x] Progressing: [] Met: [] Not Met: [] Adjusted  2.  Patient will demonstrate increased AROM to

## 2021-02-18 ENCOUNTER — HOSPITAL ENCOUNTER (OUTPATIENT)
Dept: PHYSICAL THERAPY | Age: 46
Setting detail: THERAPIES SERIES
Discharge: HOME | End: 2021-02-18
Payer: MEDICARE

## 2021-02-18 PROCEDURE — 97112 NEUROMUSCULAR REEDUCATION: CPT

## 2021-02-18 PROCEDURE — 97110 THERAPEUTIC EXERCISES: CPT

## 2021-02-18 NOTE — FLOWSHEET NOTE
[address] [facility]  Phone: [phone]   Fax: [phone]        Physical Therapy Treatment Note/ Progress Report:     Date:  2021    Patient Name:  Nuria Hodges    :  1975  MRN: [id number]  Restrictions/Precautions:    Medical/Treatment Diagnosis Information:  Diagnosis: S16. 1XXA (ICD-10-CM) - Strain of neck muscle, initial encounter , H56.578K (ICD-10-CM) - Strain of left shoulder, initial encounter, M16.0 (ICD-10-CM) - Bilateral hip joint arthritis  Treatment Diagnosis: Impaired posture, increased muscle tension, decreased cervical ROM strength, BUE decreased strength, decrease hip/core strength, low back pain. Insurance/Certification information:  PT Insurance Information: [place]  Physician Information:  Referring Practitioner: Rosalba Tobar MD , MIGUELITO Segovia  Plan of care signed (Y/N): [x]  Yes []  No     Date of Patient follow up with Physician:      Progress Report: [x]  Yes  []  No     Date Range for reporting period:  Beginnin21  Endin21    Progress report due (10 Rx/or 30 days whichever is less):       Recertification due (POC duration/ or 90 days whichever is less):  3/24/21    Visit # Insurance Allowable Auth required? Date Range    30 []  Yes  [x]  No      Latex Allergy:  [x]NO      []YES  Preferred Language for Healthcare:   [x]English       []other:    Functional Scale:         Date assessed:  NDI: raw score = 27; dysfunction = 54%    21    Pain level:  3/10 mid back , R shoulder , neck , 3/10 low back      SUBJECTIVE:     pt reports back is a lot better especially on the job. Neck is sore due , pt wants to review neck exercises today.      OBJECTIVE:     2/10: Lumbar ROM - 120 flexion, 40 ext, SB 30 B , rotation ~10% limited bilaterally , B STRENGTH: hip flexion - 3+/5, hip abd 3+/5, hip ext 3+/5 , knee ext 4/5, knee flexion 4/5    Special tests: LUZ MARINA + on R , - standing flexion test, - for SI involvement, normal lumbar joint mobility , mild hamstring tightness B      1/25    Observation: upper trap over activation , rounded shoulders, FAIR posture     Test measurements:     1/25/21    EAST test - + for parathesias in B hands after 20 seconds. Vascular refill in B hands - normal     Dermatomes Normal Abnormal Comments   Top of head (C1)         Posterior occipital region (C2)         Side of neck (C3)         Top of shoulder (C4) x       Lateral deltoid (C5) x       Tip of thumb (C6) x       Distal middle finger (C7) x       Distal fifth finger (C8) x       Medial forearm (T1) x       Lower extremity               Reflexes Normal Abnormal Comments   C5-6 Biceps x       C5-6 Brachioradialis x       C7-8 Triceps x       Linaress x       S1-2 Seated achilles x        S1-2 Prone knee bend         L3-4 Patellar tendon x        Clonus x        Babinski               CERV ROM L R L 1/25 R 1/25   Cervical Flexion 35   35   Cervical Extension 42   40   Cervical SB 25 27 25 25   Cervical rotation 50 55 45 45            ROM Left Right L 1/25 R 1/25   Shoulder Flex WNL WNL WNL WNL   Shoulder Abd WNL WNL WNL WNL   Shoulder ER C2 C2     Shoulder IR T8 L4                       Strength / Myotomes Left Right L 1/25 R 1/25   Cervical Flexion (C1-2)        Cervical Side-bending (C3)        Shoulder Shrug (C4)        Shoulder Flex 4 4 4 4   Shoulder Scap        Shoulder Abduction (C5) 4 4 4 4   Shoulder ER 4- 4- 4- 4-   Shoulder IR 4 4 4 4   Biceps (C6) 4 4 4 4   Triceps (C7) 4 4 4 4   Wrist Extension (C6)        Wrist Flexion (C7)                 Thumb Abduction (C8)        Finger Abduction (T1)           Lower extremity myotomes:   [x]? Normal                      []?Abnormal      Joint mobility:  mid thoracic               []?Normal                      [x]? Hypo              []?Hyper     Orthopaedic Special Tests  Positive  Negative  NT Comments    Hautard's            Rhomberg           Sharps-Darryl   x       Cervical Torsion / Body Rotation    x       C2 Kick   x        Modified Shear           Compression   x       Distraction    x                         RESTRICTIONS/PRECAUTIONS:     Exercises/Interventions:   Therapeutic Exercise (02355) or [id number] Resistance / level Sets/sec Reps Notes   UBE 1.0   Painful - stopped    pulleys        Chin tucks  5''x10     Upper trap stretch  2x30''     Post shoulder stretch   2x30''     Doorway pec stretch   6x30''  Hands high x3  Hands low x3    Standing thread the needle       S/L open book      TB:   Mid row  Ext   tricep ext  LPD   LIME   x20  x20  x20  x20     No money ER LIME 5'' 20    Wall push up with serratus +  1 10    Chin tuck with BUE flexion to 90   5''x10     Thoracic ext sitting at chair   5''X10  Towel at T6   Sitting cervical AROM SB   x20  Pt Hands stabilizing C5-7   Supine flexion AROM  x10     Supine Foam roll chest openers stretch - hands low  UE at 90 90   2x30''    2x30''     Supine scalene stretch   3x30'' B     Bridge   5''x10     SLR flexion  abd  x10 B  x10 B     IB S  2x30''     HSS at EOT  2x30''     Hip flexor stretch at stairs  2x30''     Piriformis stretch   2x30''                                               Therapeutic Activities (33838)       diaphramic breathing in supine     Min cues to perform 1/25    progress note/reassessment     1/25   Re-assessment for LBP/HIPS    2/10                                                                                NMR re-education (76300)       Prone chin tucks       Prone scap retraction                            Median Nerve flossing   Ulnar nerve flossing               TrA bracing in supine  10''x10     TrA with PPT  10''x10     Supine head lifts   Side lying head lifts  10''x10  10''x10 B                                                      Manual Intervention (69936)       STM to upper trap, SOR, gentle distraction to cervical spine, pain free PROM                                              Patient education:  -pt 3 sets - 1x daily - 7x weekly   Supine Active Straight Leg Raise - 10 reps - 3 sets - 1x daily - 7x weekly   Sidelying Hip Abduction - 10 reps - 3 sets - 1x daily - 7x weekly   Seated Hamstring Stretch - 10 reps - 3 sets - 1x daily - 7x weekly     Access Code: [email or web address]   URL: [employer]/   Date: 02/18/2021   Prepared by: Jessica Crowley     Exercises   Standing Hip Flexor Stretch - 10 reps - 3 sets - 1x daily - 7x weekly   Seated Piriformis Stretch with Trunk Bend - 10 reps - 3 sets - 1x daily - 7x weekly   Standing Gastroc Stretch - 10 reps - 3 sets - 1x daily - 7x weekly           Therapeutic Exercise and NMR EXR  [x] (45551) Provided verbal/tactile cueing for activities related to strengthening, flexibility, endurance, ROM  for improvements in cervical, postural, scapular, scapulothoracic and UE control with self care, reaching, carrying, lifting, house/yardwork, driving/computer work.    [] (56890) Provided verbal/tactile cueing for activities related to improving balance, coordination, kinesthetic sense, posture, motor skill, proprioception  to assist with cervical, scapular, scapulothoracic and UE control with self care, reaching, carrying, lifting, house/yardwork, driving/computer work.  [] (76447) Therapist is in constant attendance of 2 or more patients providing skilled therapy interventions, but not providing any significant amount of measurable one-on-one time to either patient, for improvements in cervical, scapular, scapulothoracic and UE control with self care, reaching, carrying, lifting, house/yardwork, driving, computer work. Therapeutic Activities:    [x] (02694 or 84532) Provided verbal/tactile cueing for activities related to improving balance, coordination, kinesthetic sense, posture, motor skill, proprioception and motor activation to allow for proper function of cervical, scapular, scapulothoracic and UE control with self care, carrying, lifting, driving/computer work. Home Exercise Program:    [x] (82477) Reviewed/Progressed HEP activities related to strengthening, flexibility, endurance, ROM of cervical, scapular, scapulothoracic and UE control with self care, reaching, carrying, lifting, house/yardwork, driving/computer work  [] (77997) Reviewed/Progressed HEP activities related to improving balance, coordination, kinesthetic sense, posture, motor skill, proprioception of cervical, scapular, scapulothoracic and UE control with self care, reaching, carrying, lifting, house/yardwork, driving/computer work      Manual Treatments:  PROM / STM / Oscillations-Mobs:  G-I, II, III, IV (PA's, Inf., Post.)  [x] (22239) Provided manual therapy to mobilize soft tissue/joints of cervical/CT, scapular GHJ and UE for the purpose of decreasing headache, modulating pain, promoting relaxation,  increasing ROM, reducing/eliminating soft tissue swelling/inflammation/restriction, improving soft tissue extensibility and allowing for proper ROM for normal function with self care, reaching, carrying, lifting, house/yardwork, driving/computer work    Modalities:  I    Charges:  Timed Code Treatment Minutes: 45   Total Treatment Minutes: 45       [] EVAL - LOW (28283)   [] EVAL - MOD (88428)  [] EVAL - HIGH (64288)  [] RE-EVAL (76361)  [x] YJ(77967) x 2        [x] NMR (49115) x 1     [] Ionto  [] Manual (41291) x 1       [] Ultrasound  [] TA x 2    [] Mech Traction (12277)  [] Home Management Training x   [] ES (un) (27610):           [] Aquatic    [] ES(attended) (01026)   [] Group    [] Other:    GOALS:  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 Independent in HEP and progression per patient tolerance, in order to prevent re-injury. [x] Progressing: [] Met: [] Not Met: [] Adjusted  2.  Patient will have a decrease in pain to facilitate improvement in movement, function, and ADLs as indicated by Functional Deficits. [x] Progressing: [] Met: [] Not Met: [] Adjusted    Long Term Goals: To be achieved in: 6 weeks  1. Disability index score of 15% or less for the NDI to assist with reaching prior level of function. [x] Progressing: [] Met: [] Not Met: [] Adjusted  2. Patient will demonstrate increased AROM to [facility] of cervical/thoracic spine to allow for proper joint functioning as indicated by patients Functional Deficits. [x] Progressing: [] Met: [] Not Met: [] Adjusted  3. Patient will demonstrate an increase in postural awareness and control and activation of  Deep cervical stabilizers to allow for proper functional mobility as indicated by patients Functional Deficits. [x] Progressing: [] Met: [] Not Met: [] Adjusted  4. Patient will return to functional activities including work, lifting, without increased symptoms or restriction. [x] Progressing: [] Met: [] Not Met: [] Adjusted   5. Patient will improve B hip strength to 4+/5 in order to decrease pain and improve function. [x] Progressing: [] Met: [] Not Met: [] Adjusted  6. Patient will improve core strength and mobility for improved function by scoring 20% or less on JUAN PABLO. [x] Progressing: [] Met: [] Not Met: [] Adjusted    Overall Progression Towards Functional goals/ Treatment Progress Update:  [] Patient is progressing as expected towards functional goals listed. [x] Progression is slowed due to complexities/Impairments listed. [] Progression has been slowed due to co-morbidities.   [] Plan just implemented, too soon to assess goals progression <30days   [] Goals require adjustment due to lack of progress  [] Patient is not progressing as expected and requires additional follow up with physician  [] Other    Persisting Functional Limitations/Impairments:  []Sitting []Standing   []Walking []Squatting/bending    []Stairs []ADL's []Transfers []Reaching  [x]Housework [x]Lifting  []Driving [x]Job related tasks  [x]Sports/Recreation : disc golf  [x]Sleeping  []Other:    ASSESSMENT:    Reviewed cervical head lifts and added the above bold hip stretches. Pt is reporting stretches and exercises are slowly helping, he is having less back pain currently when he is working on the job. Continue to progress as tolerated. Treatment/Activity Tolerance:  [x] Patient able to complete tx  [] Patient limited by fatique  [x] Patient limited by pain  [] Patient limited by other medical complications  [] Other:     Prognosis: [x] Good [] Fair  [] Poor    Patient Requires Follow-up: [x] Yes  [] No    PLAN: See eval. PT 1-2x / week for 6 weeks. TOS, decrease upper trap recruitment, proper breathing, decrease neural tension. Pt insurance does not cover dry needling . 2/10 - ADD HIP STRENGTHENING , LUMBAR STABILITY , CORE STRENGTH . If pt does not progress - aquatic therapy? [x] Continue per plan of care [] Alter current plan (see comments)  [] Plan of care initiated [] Hold pending MD visit [] Discharge    Electronically signed by: Yaw Smith PT       Note: If patient does not return for scheduled/ recommended follow up visits, this note will serve as a discharge from care along with most recent update on progress.

## 2021-02-22 ENCOUNTER — HOSPITAL ENCOUNTER (OUTPATIENT)
Dept: PHYSICAL THERAPY | Age: 46
Setting detail: THERAPIES SERIES
Discharge: HOME | End: 2021-02-22
Payer: MEDICARE

## 2021-02-22 PROCEDURE — 97112 NEUROMUSCULAR REEDUCATION: CPT

## 2021-02-22 PROCEDURE — 97110 THERAPEUTIC EXERCISES: CPT

## 2021-02-22 NOTE — FLOWSHEET NOTE
[address] [facility] Physical Therapy  Phone: [phone]   Fax: [phone]        Physical Therapy Treatment Note/ Progress Report:     Date:  2021    Patient Name:  Milo Amador    :  1975  MRN: [id number]  Restrictions/Precautions:    Medical/Treatment Diagnosis Information:  Diagnosis: S16. 1XXA (ICD-10-CM) - Strain of neck muscle, initial encounter , F56.965V (ICD-10-CM) - Strain of left shoulder, initial encounter, M16.0 (ICD-10-CM) - Bilateral hip joint arthritis  Treatment Diagnosis: Impaired posture, increased muscle tension, decreased cervical ROM strength, BUE decreased strength, decrease hip/core strength, low back pain. Insurance/Certification information:  PT Insurance Information: [place]  Physician Information:  Referring Practitioner: Ruthy Barrios MD , MIGUELITO Delgadillo  Plan of care signed (Y/N): [x]  Yes []  No     Date of Patient follow up with Physician:      Progress Report: [x]  Yes  []  No     Date Range for reporting period:  Beginnin21  Endin21    Progress report due (10 Rx/or 30 days whichever is less):       Recertification due (POC duration/ or 90 days whichever is less):  3/24/21    Visit # Insurance Allowable Auth required? Date Range    []  Yes  [x]  No      Latex Allergy:  [x]NO      []YES  Preferred Language for Healthcare:   [x]English       []other:    Functional Scale:         Date assessed:  NDI: raw score = 27; dysfunction = 54%    21    Pain level:  3/10 mid back ,, neck , 5/10 low back , hips 0/10 , low back 0/10     SUBJECTIVE:    Pt was achy this weekend , did not do too much , did not perform HEP. Pt is looking into possibly using medical marijuana and or injections for pain relief as well.      OBJECTIVE:     2/10: Lumbar ROM - 120 flexion, 40 ext, SB 30 B , rotation ~10% limited bilaterally , B STRENGTH: hip flexion - 3+/5, hip abd 3+/5, hip ext 3+/5 , knee ext 4/5, knee flexion 4/5    Special tests: LUZ MARINA + on R , - standing flexion test, - for SI involvement, normal lumbar joint mobility , mild hamstring tightness B      1/25    Observation: upper trap over activation , rounded shoulders, FAIR posture     Test measurements:     1/25/21    EAST test - + for parathesias in B hands after 20 seconds. Vascular refill in B hands - normal     Dermatomes Normal Abnormal Comments   Top of head (C1)         Posterior occipital region (C2)         Side of neck (C3)         Top of shoulder (C4) x       Lateral deltoid (C5) x       Tip of thumb (C6) x       Distal middle finger (C7) x       Distal fifth finger (C8) x       Medial forearm (T1) x       Lower extremity               Reflexes Normal Abnormal Comments   C5-6 Biceps x       C5-6 Brachioradialis x       C7-8 Triceps x       Linaress x       S1-2 Seated achilles x        S1-2 Prone knee bend         L3-4 Patellar tendon x        Clonus x        Babinski               CERV ROM L R L 1/25 R 1/25   Cervical Flexion 35   35   Cervical Extension 42   40   Cervical SB 25 27 25 25   Cervical rotation 50 55 45 45            ROM Left Right L 1/25 R 1/25   Shoulder Flex WNL WNL WNL WNL   Shoulder Abd WNL WNL WNL WNL   Shoulder ER C2 C2     Shoulder IR T8 L4                       Strength / Myotomes Left Right L 1/25 R 1/25   Cervical Flexion (C1-2)        Cervical Side-bending (C3)        Shoulder Shrug (C4)        Shoulder Flex 4 4 4 4   Shoulder Scap        Shoulder Abduction (C5) 4 4 4 4   Shoulder ER 4- 4- 4- 4-   Shoulder IR 4 4 4 4   Biceps (C6) 4 4 4 4   Triceps (C7) 4 4 4 4   Wrist Extension (C6)        Wrist Flexion (C7)                 Thumb Abduction (C8)        Finger Abduction (T1)           Lower extremity myotomes:   [x]? Normal                      []?Abnormal      Joint mobility:  mid thoracic               []?Normal                      [x]? Hypo              []?Hyper     Orthopaedic Special Tests  Positive  Negative  NT Comments    Velia          Rhomberg           Sharps-Darryl   x       Cervical Torsion / Body Rotation    x       C2 Kick   x        Modified Shear           Compression   x       Distraction    x                          RESTRICTIONS/PRECAUTIONS:     Exercises/Interventions:   Therapeutic Exercise (81390) or [id number] Resistance / level Sets/sec Reps Notes   UBE 1.0   Painful - stopped    pulleys        Chin tucks  5''x10     Upper trap stretch  2x30''     Post shoulder stretch   2x30''     Doorway pec stretch   6x30''  Hands high x3  Hands low x3    Standing thread the needle       S/L open book      TB:   Mid row  Ext   tricep ext  LPD   LIME   x20  x20  x20  x20     No money ER LIME 5'' 20    Wall push up with serratus +  1 10    Chin tuck with BUE flexion to 90   5''x10     Thoracic ext sitting at chair   5''X10  Towel at T6   Sitting cervical AROM SB   x20  Pt Hands stabilizing C5-7   Supine flexion AROM  x10     Supine Foam roll chest openers stretch - hands low  UE at 90 90   2x30''    2x30''     Supine scalene stretch   3x30'' B     Bridge   5''x10     SLR flexion  abd  x10 B  x10 B     IB S  2x30''     HSS at EOT  2x30''     Hip flexor stretch at stairs  2x30''     Piriformis stretch   2x30''     TG squats  3x10                                                                           Therapeutic Activities (48086)       diaphramic breathing in supine     Min cues to perform 1/25    progress note/reassessment     1/25   Re-assessment for LBP/HIPS    2/10                                                                                NMR re-education (25261)       Prone chin tucks       Prone scap retraction                            Median Nerve flossing   Ulnar nerve flossing               TrA bracing in supine  10''x10     TrA with PPT  10''x10     Supine head lifts   Side lying head lifts  10''x10  10''x10 B     Pelvic tilts on SB with TrA actvation A/P, M/L, CW/CWW  x20     LTR with SB and TrA activation   X20 Manual Intervention (98311)       STM to upper trap, SOR, gentle distraction to cervical spine, pain free PROM                                              Patient education:  -pt educated on diagnosis, prognosis and expectations for rehab  -all pt questions were answered    Home Exercise Program:  Access Code: [email or web address]   URL: [employer]   Date: 01/04/2021   Prepared by: Wyonia Negra     Exercises   Seated Scapular Retraction - 10 reps - 3 sets - 1x daily - 7x weekly   Seated Cervical Retraction - 10 reps - 3 sets - 1x daily - 7x weekly   Standing Isometric Cervical Flexion with Manual Resistance - 10 reps - 3 sets - 1x daily - 7x weekly   Standing Isometric Cervical Extension with Manual Resistance - 10 reps - 3 sets - 1x daily - 7x weekly   Seated Isometric Cervical Sidebending - 10 reps - 3 sets - 1x daily - 7x weekly     Access Code: [email or web address]   URL: [facility]   Date: 01/14/2021   Prepared by: Wyonia Negra     Exercises   Seated Levator Scapulae Stretch - 10 reps - 3 sets - 1x daily - 7x weekly   Seated Cervical Sidebending Stretch - 10 reps - 3 sets - 1x daily - 7x weekly   Doorway Pec Stretch at 90 Degrees Abduction - 10 reps - 3 sets - 1x daily - 7x weekly   Standing Shoulder Posterior Capsule Stretch - 10 reps - 3 sets - 1x daily - 7x weekly       Access Code: [email or web address]   URL: [facility]   Date: 01/18/2021   Prepared by: Wyonia Negra     Exercises   Sidelying Thoracic Rotation - 10 reps - 3 sets - 1x daily - 7x weekly   Median Nerve Flossing - Tray - 10 reps - 3 sets - 1x daily - 7x weekly   Standing Radial Nerve Glide - 10 reps - 3 sets - 1x daily - 7x weekly   Ulnar Nerve Flossing - 10 reps - 3 sets - 1x daily - 7x weekly     Access Code: [email or web address]   URL: [email or web address]   Date: 01/21/2021   Prepared by: Wyonia Negra     Exercises   Supine Diaphragmatic Breathing - 10 reps - 3 sets - 1x daily - 7x weekly Seated Thoracic Self-Mobilization - 10 reps - 3 sets - 1x daily - 7x weekly     Access Code: [email or web address]   URL: [email or web address]   Date: 02/10/2021   Prepared by: Ples Slot     Exercises   Supine Bridge - 10 reps - 3 sets - 1x daily - 7x weekly   Supine Active Straight Leg Raise - 10 reps - 3 sets - 1x daily - 7x weekly   Sidelying Hip Abduction - 10 reps - 3 sets - 1x daily - 7x weekly   Seated Hamstring Stretch - 10 reps - 3 sets - 1x daily - 7x weekly     Access Code: [email or web address]   URL: [email or web address]   Date: 02/18/2021   Prepared by: Ples Slot     Exercises   Standing Hip Flexor Stretch - 10 reps - 3 sets - 1x daily - 7x weekly   Seated Piriformis Stretch with Trunk Bend - 10 reps - 3 sets - 1x daily - 7x weekly   Standing Gastroc Stretch - 10 reps - 3 sets - 1x daily - 7x weekly           Therapeutic Exercise and NMR EXR  [x] (69528) Provided verbal/tactile cueing for activities related to strengthening, flexibility, endurance, ROM  for improvements in cervical, postural, scapular, scapulothoracic and UE control with self care, reaching, carrying, lifting, house/yardwork, driving/computer work.    [] (34734) Provided verbal/tactile cueing for activities related to improving balance, coordination, kinesthetic sense, posture, motor skill, proprioception  to assist with cervical, scapular, scapulothoracic and UE control with self care, reaching, carrying, lifting, house/yardwork, driving/computer work.  [] (62354) Therapist is in constant attendance of 2 or more patients providing skilled therapy interventions, but not providing any significant amount of measurable one-on-one time to either patient, for improvements in cervical, scapular, scapulothoracic and UE control with self care, reaching, carrying, lifting, house/yardwork, driving, computer work.      Therapeutic Activities:    [x] (46679 or 04042) Provided verbal/tactile cueing for activities related to improving balance, coordination, kinesthetic sense, posture, motor skill, proprioception and motor activation to allow for proper function of cervical, scapular, scapulothoracic and UE control with self care, carrying, lifting, driving/computer work.      Home Exercise Program:    [x] (02075) Reviewed/Progressed HEP activities related to strengthening, flexibility, endurance, ROM of cervical, scapular, scapulothoracic and UE control with self care, reaching, carrying, lifting, house/yardwork, driving/computer work  [] (74905) Reviewed/Progressed HEP activities related to improving balance, coordination, kinesthetic sense, posture, motor skill, proprioception of cervical, scapular, scapulothoracic and UE control with self care, reaching, carrying, lifting, house/yardwork, driving/computer work      Manual Treatments:  PROM / STM / Oscillations-Mobs:  G-I, II, III, IV (PA's, Inf., Post.)  [] (90573) Provided manual therapy to mobilize soft tissue/joints of cervical/CT, scapular GHJ and UE for the purpose of decreasing headache, modulating pain, promoting relaxation,  increasing ROM, reducing/eliminating soft tissue swelling/inflammation/restriction, improving soft tissue extensibility and allowing for proper ROM for normal function with self care, reaching, carrying, lifting, house/yardwork, driving/computer work    Modalities:  I    Charges:  Timed Code Treatment Minutes: 45   Total Treatment Minutes: 45       [] EVAL - LOW (17978)   [] EVAL - MOD (19595)  [] EVAL - HIGH (74746)  [] RE-EVAL (41373)  [x] FP(01013) x 1       [x] NMR (49512) x 2    [] Ionto  [] Manual (17378) x 1       [] Ultrasound  [] TA x 2    [] Mech Traction (07850)  [] Home Management Training x   [] ES (un) (01430):           [] Aquatic    [] ES(attended) (62702)   [] Group    [] Other:    GOALS:  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 Independent in HEP and progression per patient tolerance, in order to prevent re-injury. [x] Progressing: [] Met: [] Not Met: [] Adjusted  2. Patient will have a decrease in pain to facilitate improvement in movement, function, and ADLs as indicated by Functional Deficits. [x] Progressing: [] Met: [] Not Met: [] Adjusted    Long Term Goals: To be achieved in: 6 weeks  1. Disability index score of 15% or less for the NDI to assist with reaching prior level of function. [x] Progressing: [] Met: [] Not Met: [] Adjusted  2. Patient will demonstrate increased AROM to [employer] of cervical/thoracic spine to allow for proper joint functioning as indicated by patients Functional Deficits. [x] Progressing: [] Met: [] Not Met: [] Adjusted  3. Patient will demonstrate an increase in postural awareness and control and activation of  Deep cervical stabilizers to allow for proper functional mobility as indicated by patients Functional Deficits. [x] Progressing: [] Met: [] Not Met: [] Adjusted  4. Patient will return to functional activities including work, lifting, without increased symptoms or restriction. [x] Progressing: [] Met: [] Not Met: [] Adjusted   5. Patient will improve B hip strength to 4+/5 in order to decrease pain and improve function. [x] Progressing: [] Met: [] Not Met: [] Adjusted  6. Patient will improve core strength and mobility for improved function by scoring 20% or less on JUAN PABLO. [x] Progressing: [] Met: [] Not Met: [] Adjusted    Overall Progression Towards Functional goals/ Treatment Progress Update:  [] Patient is progressing as expected towards functional goals listed. [x] Progression is slowed due to complexities/Impairments listed. [] Progression has been slowed due to co-morbidities.   [] Plan just implemented, too soon to assess goals progression <30days   [] Goals require adjustment due to lack of progress  [] Patient is not progressing as expected and requires additional follow up with physician  [] Other    Persisting Functional Limitations/Impairments:  []Sitting []Standing   []Walking []Squatting/bending    []Stairs []ADL's    []Transfers []Reaching  [x]Housework [x]Lifting  []Driving [x]Job related tasks  [x]Sports/Recreation : disc golf  [x]Sleeping  []Other:    ASSESSMENT:     Progressions added in bold above, pelvic tilts added today with good results. Pt able to tolerate with no issues. Continue to progress as tolerated with chronic pain management. Treatment/Activity Tolerance:  [x] Patient able to complete tx  [] Patient limited by fatique  [x] Patient limited by pain  [] Patient limited by other medical complications  [] Other:     Prognosis: [x] Good [] Fair  [] Poor    Patient Requires Follow-up: [x] Yes  [] No    PLAN: See eval. PT 1-2x / week for 6 weeks. TOS, decrease upper trap recruitment, proper breathing, decrease neural tension. Pt insurance does not cover dry needling . 2/10 - ADD HIP STRENGTHENING , LUMBAR STABILITY , CORE STRENGTH . If pt does not progress - aquatic therapy? [x] Continue per plan of care [] Alter current plan (see comments)  [] Plan of care initiated [] Hold pending MD visit [] Discharge    Electronically signed by: Nancy Marks PT       Note: If patient does not return for scheduled/ recommended follow up visits, this note will serve as a discharge from care along with most recent update on progress.

## 2021-02-24 ENCOUNTER — HOSPITAL ENCOUNTER (OUTPATIENT)
Dept: PHYSICAL THERAPY | Age: 46
Setting detail: THERAPIES SERIES
Discharge: HOME | End: 2021-02-24
Payer: MEDICARE

## 2021-02-24 PROCEDURE — 97112 NEUROMUSCULAR REEDUCATION: CPT

## 2021-02-24 PROCEDURE — 97110 THERAPEUTIC EXERCISES: CPT

## 2021-02-24 NOTE — FLOWSHEET NOTE
[address] [facility]  Phone: [phone]   Fax: [phone]        Physical Therapy Treatment Note/ Progress Report:     Date:  2021    Patient Name:  Toro Mahoney    :  1975  MRN: [id number]  Restrictions/Precautions:    Medical/Treatment Diagnosis Information:  Diagnosis: S16. 1XXA (ICD-10-CM) - Strain of neck muscle, initial encounter , T92.479Y (ICD-10-CM) - Strain of left shoulder, initial encounter, M16.0 (ICD-10-CM) - Bilateral hip joint arthritis  Treatment Diagnosis: Impaired posture, increased muscle tension, decreased cervical ROM strength, BUE decreased strength, decrease hip/core strength, low back pain. Insurance/Certification information:  PT Insurance Information: [place]  Physician Information:  Referring Practitioner: Darshan Baker MD , MIGUELITO Nava  Plan of care signed (Y/N): [x]  Yes []  No     Date of Patient follow up with Physician:      Progress Report: []  Yes  [x]  No     Date Range for reporting period:  Beginnin21  Endin21    Progress report due (10 Rx/or 30 days whichever is less):       Recertification due (POC duration/ or 90 days whichever is less):  3/24/21    Visit # Insurance Allowable Auth required? Date Range    []  Yes  [x]  No      Latex Allergy:  [x]NO      []YES  Preferred Language for Healthcare:   [x]English       []other:    Functional Scale:         Date assessed:  NDI: raw score = 27; dysfunction = 54%    21    Pain level:  3/10 mid back ,, neck , 5/10 low back , hips 0/10 , low back 0/10     SUBJECTIVE:    Pt report he is sore this morning due to his 10 hour work days. Sore in his neck and back. Pt did wake up a couple times last night in pain.      OBJECTIVE:     2/10: Lumbar ROM - 120 flexion, 40 ext, SB 30 B , rotation ~10% limited bilaterally , B STRENGTH: hip flexion - 3+/5, hip abd 3+/5, hip ext 3+/5 , knee ext 4/5, knee flexion 4/5    Special tests: LUZ MARINA + on R , - standing flexion test, - for SI involvement, normal lumbar joint mobility , mild hamstring tightness B      1/25    Observation: upper trap over activation , rounded shoulders, FAIR posture     Test measurements:     1/25/21    EAST test - + for parathesias in B hands after 20 seconds. Vascular refill in B hands - normal     Dermatomes Normal Abnormal Comments   Top of head (C1)         Posterior occipital region (C2)         Side of neck (C3)         Top of shoulder (C4) x       Lateral deltoid (C5) x       Tip of thumb (C6) x       Distal middle finger (C7) x       Distal fifth finger (C8) x       Medial forearm (T1) x       Lower extremity               Reflexes Normal Abnormal Comments   C5-6 Biceps x       C5-6 Brachioradialis x       C7-8 Triceps x       Linaress x       S1-2 Seated achilles x        S1-2 Prone knee bend         L3-4 Patellar tendon x        Clonus x        Babinski               CERV ROM L R L 1/25 R 1/25   Cervical Flexion 35   35   Cervical Extension 42   40   Cervical SB 25 27 25 25   Cervical rotation 50 55 45 45            ROM Left Right L 1/25 R 1/25   Shoulder Flex WNL WNL WNL WNL   Shoulder Abd WNL WNL WNL WNL   Shoulder ER C2 C2     Shoulder IR T8 L4                       Strength / Myotomes Left Right L 1/25 R 1/25   Cervical Flexion (C1-2)        Cervical Side-bending (C3)        Shoulder Shrug (C4)        Shoulder Flex 4 4 4 4   Shoulder Scap        Shoulder Abduction (C5) 4 4 4 4   Shoulder ER 4- 4- 4- 4-   Shoulder IR 4 4 4 4   Biceps (C6) 4 4 4 4   Triceps (C7) 4 4 4 4   Wrist Extension (C6)        Wrist Flexion (C7)                 Thumb Abduction (C8)        Finger Abduction (T1)           Lower extremity myotomes:   [x]? Normal                      []?Abnormal      Joint mobility:  mid thoracic               []?Normal                      [x]? Hypo              []?Hyper     Orthopaedic Special Tests  Positive  Negative  NT Comments    Velia Jones           Sharps-Darryl   x       Cervical Torsion / Body Rotation    x       C2 Kick   x        Modified Shear           Compression   x       Distraction    x                          RESTRICTIONS/PRECAUTIONS:     Exercises/Interventions:   Therapeutic Exercise (92164) or [phone] Resistance / level Sets/sec Reps Notes   UBE 1.0   Painful - stopped    pulleys        Chin tucks      Upper trap stretch      Post shoulder stretch       Doorway pec stretch    Hands high x3  Hands low x3    Standing thread the needle       S/L open book      TB:   Mid row  Ext   tricep ext  LPD   LIME      No money ER LIME 20    Wall push up with serratus +  10    Chin tuck with BUE flexion to 90       Thoracic ext sitting at chair    Towel at T6   Sitting cervical AROM SB    Pt Hands stabilizing C5-7   Supine flexion AROM      Supine Foam roll chest openers stretch - hands low  UE at 90 90   ''     Supine scalene stretch        Bridge   5''x10     SLR flexion  abd  x10 B  x10 B     IB S  2x30''     HSS at EOT  2x30''     Hip flexor stretch at stairs  2x30''     Piriformis stretch   2x30''     TG squats  3x10     SL clamshell LIME 2x10 B                                                                    Therapeutic Activities (41111)       diaphramic breathing in supine     Min cues to perform 1/25    progress note/reassessment     1/25   Re-assessment for LBP/HIPS    2/10                                                                                NMR re-education (68684)       Prone chin tucks       Prone scap retraction                            Median Nerve flossing   Ulnar nerve flossing               TrA bracing in supine  10''x10     TrA with PPT  10''x10     Supine head lifts   Side lying head lifts  10''x10  10''x10 B     Pelvic tilts on SB with TrA actvation A/P, M/L, CW/CWW  x30     LTR with SB and TrA activation   X20     multifidi rows 2 plates 6X39 B                                                             Manual Intervention (04560)       STM to upper trap, SOR, gentle distraction to cervical spine, pain free PROM                                              Patient education:  -pt educated on diagnosis, prognosis and expectations for rehab  -all pt questions were answered    Home Exercise Program:  Access Code: [email or web address]   URL: [email or web address]   Date: 01/04/2021   Prepared by: Prisca Eulalio     Exercises   Seated Scapular Retraction - 10 reps - 3 sets - 1x daily - 7x weekly   Seated Cervical Retraction - 10 reps - 3 sets - 1x daily - 7x weekly   Standing Isometric Cervical Flexion with Manual Resistance - 10 reps - 3 sets - 1x daily - 7x weekly   Standing Isometric Cervical Extension with Manual Resistance - 10 reps - 3 sets - 1x daily - 7x weekly   Seated Isometric Cervical Sidebending - 10 reps - 3 sets - 1x daily - 7x weekly     Access Code: [email or web address]   URL: [email or web address]   Date: 01/14/2021   Prepared by: Prisca Eulalio     Exercises   Seated Levator Scapulae Stretch - 10 reps - 3 sets - 1x daily - 7x weekly   Seated Cervical Sidebending Stretch - 10 reps - 3 sets - 1x daily - 7x weekly   Doorway Pec Stretch at 90 Degrees Abduction - 10 reps - 3 sets - 1x daily - 7x weekly   Standing Shoulder Posterior Capsule Stretch - 10 reps - 3 sets - 1x daily - 7x weekly       Access Code: [email or web address]   URL: [email or web address]   Date: 01/18/2021   Prepared by: Prisca Eulalio     Exercises   Sidelying Thoracic Rotation - 10 reps - 3 sets - 1x daily - 7x weekly   Median Nerve Flossing - Tray - 10 reps - 3 sets - 1x daily - 7x weekly   Standing Radial Nerve Glide - 10 reps - 3 sets - 1x daily - 7x weekly   Ulnar Nerve Flossing - 10 reps - 3 sets - 1x daily - 7x weekly     Access Code: [email or web address]   URL: [email or web address]   Date: 01/21/2021   Prepared by: Prisca Eulalio     Exercises   Supine Diaphragmatic Breathing - 10 reps - 3 sets - 1x daily - 7x weekly   Seated Thoracic Self-Mobilization - 10 reps - 3 sets - 1x daily - 7x weekly     Access Code: [email or web address]   URL: [employer]/   Date: 02/10/2021   Prepared by: Nickie Page     Exercises   Supine Bridge - 10 reps - 3 sets - 1x daily - 7x weekly   Supine Active Straight Leg Raise - 10 reps - 3 sets - 1x daily - 7x weekly   Sidelying Hip Abduction - 10 reps - 3 sets - 1x daily - 7x weekly   Seated Hamstring Stretch - 10 reps - 3 sets - 1x daily - 7x weekly     Access Code: [email or web address]   URL: [employer]/   Date: 02/18/2021   Prepared by: Nickie Page     Exercises   Standing Hip Flexor Stretch - 10 reps - 3 sets - 1x daily - 7x weekly   Seated Piriformis Stretch with Trunk Bend - 10 reps - 3 sets - 1x daily - 7x weekly   Standing Gastroc Stretch - 10 reps - 3 sets - 1x daily - 7x weekly           Therapeutic Exercise and NMR EXR  [x] (97662) Provided verbal/tactile cueing for activities related to strengthening, flexibility, endurance, ROM  for improvements in cervical, postural, scapular, scapulothoracic and UE control with self care, reaching, carrying, lifting, house/yardwork, driving/computer work.    [] (97887) Provided verbal/tactile cueing for activities related to improving balance, coordination, kinesthetic sense, posture, motor skill, proprioception  to assist with cervical, scapular, scapulothoracic and UE control with self care, reaching, carrying, lifting, house/yardwork, driving/computer work.  [] (57532) Therapist is in constant attendance of 2 or more patients providing skilled therapy interventions, but not providing any significant amount of measurable one-on-one time to either patient, for improvements in cervical, scapular, scapulothoracic and UE control with self care, reaching, carrying, lifting, house/yardwork, driving, computer work.      Therapeutic Activities:    [x] (54617 or 91090) Provided verbal/tactile cueing for activities related to improving balance, coordination, kinesthetic sense, posture, motor skill, proprioception and motor activation to allow for proper function of cervical, scapular, scapulothoracic and UE control with self care, carrying, lifting, driving/computer work. Home Exercise Program:    [x] (75250) Reviewed/Progressed HEP activities related to strengthening, flexibility, endurance, ROM of cervical, scapular, scapulothoracic and UE control with self care, reaching, carrying, lifting, house/yardwork, driving/computer work  [] (02099) Reviewed/Progressed HEP activities related to improving balance, coordination, kinesthetic sense, posture, motor skill, proprioception of cervical, scapular, scapulothoracic and UE control with self care, reaching, carrying, lifting, house/yardwork, driving/computer work      Manual Treatments:  PROM / STM / Oscillations-Mobs:  G-I, II, III, IV (PA's, Inf., Post.)  [] (19631) Provided manual therapy to mobilize soft tissue/joints of cervical/CT, scapular GHJ and UE for the purpose of decreasing headache, modulating pain, promoting relaxation,  increasing ROM, reducing/eliminating soft tissue swelling/inflammation/restriction, improving soft tissue extensibility and allowing for proper ROM for normal function with self care, reaching, carrying, lifting, house/yardwork, driving/computer work    Modalities:  I    Charges:  Timed Code Treatment Minutes: 40   Total Treatment Minutes: 40       [] EVAL - LOW (78236)   [] EVAL - MOD (11472)  [] EVAL - HIGH (05740)  [] RE-EVAL (44388)  [x] BB(12268) x 1       [x] NMR (29197) x 2    [] Ionto  [] Manual (55334) x 1       [] Ultrasound  [] TA x 2    [] Mech Traction (83966)  [] Home Management Training x   [] ES (un) (66919):           [] Aquatic    [] ES(attended) (41316)   [] Group    [] Other:    GOALS:  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  Independent in HEP and progression per patient tolerance, in order to prevent re-injury. [x] Progressing: [] Met: [] Not Met: [] Adjusted  2. Patient will have a decrease in pain to facilitate improvement in movement, function, and ADLs as indicated by Functional Deficits. [x] Progressing: [] Met: [] Not Met: [] Adjusted    Long Term Goals: To be achieved in: 6 weeks  1. Disability index score of 15% or less for the NDI to assist with reaching prior level of function. [x] Progressing: [] Met: [] Not Met: [] Adjusted  2. Patient will demonstrate increased AROM to [facility] of cervical/thoracic spine to allow for proper joint functioning as indicated by patients Functional Deficits. [x] Progressing: [] Met: [] Not Met: [] Adjusted  3. Patient will demonstrate an increase in postural awareness and control and activation of  Deep cervical stabilizers to allow for proper functional mobility as indicated by patients Functional Deficits. [x] Progressing: [] Met: [] Not Met: [] Adjusted  4. Patient will return to functional activities including work, lifting, without increased symptoms or restriction. [x] Progressing: [] Met: [] Not Met: [] Adjusted   5. Patient will improve B hip strength to 4+/5 in order to decrease pain and improve function. [x] Progressing: [] Met: [] Not Met: [] Adjusted  6. Patient will improve core strength and mobility for improved function by scoring 20% or less on JUAN PABLO. [x] Progressing: [] Met: [] Not Met: [] Adjusted    Overall Progression Towards Functional goals/ Treatment Progress Update:  [] Patient is progressing as expected towards functional goals listed. [x] Progression is slowed due to complexities/Impairments listed. [] Progression has been slowed due to co-morbidities.   [] Plan just implemented, too soon to assess goals progression <30days   [] Goals require adjustment due to lack of progress  [] Patient is not progressing as expected and requires additional follow up with physician  [] Other    Persisting Functional Limitations/Impairments:  []Sitting []Standing   []Walking []Squatting/bending    []Stairs []ADL's    []Transfers []Reaching  [x]Housework [x]Lifting  []Driving [x]Job related tasks  [x]Sports/Recreation : disc golf  [x]Sleeping  []Other:    ASSESSMENT:     Progressions added in bold above, pelvic tilts increased to 30 today  with good results. Pt able to tolerate with fatigue at end of treatment. Continue to progress as tolerated with chronic pain management. Treatment/Activity Tolerance:  [x] Patient able to complete tx  [] Patient limited by fatique  [x] Patient limited by pain  [] Patient limited by other medical complications  [] Other:      Prognosis: [x] Good [] Fair  [] Poor    Patient Requires Follow-up: [x] Yes  [] No    PLAN: See eval. PT 1-2x / week for 6 weeks. TOS, decrease upper trap recruitment, proper breathing, decrease neural tension. Pt insurance does not cover dry needling . 2/10 - ADD HIP STRENGTHENING , LUMBAR STABILITY , CORE STRENGTH . If pt does not progress - aquatic therapy? [x] Continue per plan of care [] Alter current plan (see comments)  [] Plan of care initiated [] Hold pending MD visit [] Discharge    Electronically signed by: Randy Daniels PT       Note: If patient does not return for scheduled/ recommended follow up visits, this note will serve as a discharge from care along with most recent update on progress.

## 2021-03-01 ENCOUNTER — HOSPITAL ENCOUNTER (OUTPATIENT)
Dept: PHYSICAL THERAPY | Age: 46
Setting detail: THERAPIES SERIES
Discharge: HOME | End: 2021-03-01
Payer: MEDICARE

## 2021-03-01 PROCEDURE — 97110 THERAPEUTIC EXERCISES: CPT

## 2021-03-01 PROCEDURE — 97112 NEUROMUSCULAR REEDUCATION: CPT

## 2021-03-01 NOTE — FLOWSHEET NOTE
[address] [facility] Physical Therapy  Phone: [phone]   Fax: [phone]      Physical Therapy Treatment Note/ Progress Report:     Date:  3/1/2021    Patient Name:  Milady Nassar    :  1975  MRN: [id number]  Restrictions/Precautions:    Medical/Treatment Diagnosis Information:  Diagnosis: S16. 1XXA (ICD-10-CM) - Strain of neck muscle, initial encounter , T69.382P (ICD-10-CM) - Strain of left shoulder, initial encounter, M16.0 (ICD-10-CM) - Bilateral hip joint arthritis  Treatment Diagnosis: Impaired posture, increased muscle tension, decreased cervical ROM strength, BUE decreased strength, decrease hip/core strength, low back pain. Insurance/Certification information:  PT Insurance Information: [place]  Physician Information:  Referring Practitioner: Bishop Zaragoza MD , MIGUELITO Flores  Plan of care signed (Y/N): [x]  Yes []  No     Date of Patient follow up with Physician:      Progress Report: []  Yes  [x]  No     Date Range for reporting period:  Beginnin21  Endin21    Progress report due (10 Rx/or 30 days whichever is less):       Recertification due (POC duration/ or 90 days whichever is less):  3/24/21    Visit # Insurance Allowable Auth required? Date Range   15 30 []  Yes  [x]  No      Latex Allergy:  [x]NO      []YES  Preferred Language for Healthcare:   [x]English       []other:    Functional Scale:         Date assessed:  NDI: raw score = 27; dysfunction = 54%    21    Pain level:  3/10 mid back ,, neck , 7/10 low back , hips 0/10      SUBJECTIVE:   Pt reports last night during the night low back flared 7/10 this morning - feels tight around the belt line. His work has been ramping up and working 10 hour days.       OBJECTIVE:     2/10: Lumbar ROM - 120 flexion, 40 ext, SB 30 B , rotation ~10% limited bilaterally , B STRENGTH: hip flexion - 3+/5, hip abd 3+/5, hip ext 3+/5 , knee ext 4/5, knee flexion 4/5    Special tests: LUZ MARINA + on R , - standing flexion test, - for SI involvement, normal lumbar joint mobility , mild hamstring tightness B      1/25    Observation: upper trap over activation , rounded shoulders, FAIR posture     Test measurements:     1/25/21    EAST test - + for parathesias in B hands after 20 seconds. Vascular refill in B hands - normal     Dermatomes Normal Abnormal Comments   Top of head (C1)         Posterior occipital region (C2)         Side of neck (C3)         Top of shoulder (C4) x       Lateral deltoid (C5) x       Tip of thumb (C6) x       Distal middle finger (C7) x       Distal fifth finger (C8) x       Medial forearm (T1) x       Lower extremity               Reflexes Normal Abnormal Comments   C5-6 Biceps x       C5-6 Brachioradialis x       C7-8 Triceps x       Linaress x       S1-2 Seated achilles x        S1-2 Prone knee bend         L3-4 Patellar tendon x        Clonus x        Babinski               CERV ROM L R L 1/25 R 1/25   Cervical Flexion 35   35   Cervical Extension 42   40   Cervical SB 25 27 25 25   Cervical rotation 50 55 45 45            ROM Left Right L 1/25 R 1/25   Shoulder Flex WNL WNL WNL WNL   Shoulder Abd WNL WNL WNL WNL   Shoulder ER C2 C2     Shoulder IR T8 L4                       Strength / Myotomes Left Right L 1/25 R 1/25   Cervical Flexion (C1-2)        Cervical Side-bending (C3)        Shoulder Shrug (C4)        Shoulder Flex 4 4 4 4   Shoulder Scap        Shoulder Abduction (C5) 4 4 4 4   Shoulder ER 4- 4- 4- 4-   Shoulder IR 4 4 4 4   Biceps (C6) 4 4 4 4   Triceps (C7) 4 4 4 4   Wrist Extension (C6)        Wrist Flexion (C7)                 Thumb Abduction (C8)        Finger Abduction (T1)           Lower extremity myotomes:   [x]? Normal                      []?Abnormal      Joint mobility:  mid thoracic               []?Normal                      [x]? Hypo              []?Hyper     Orthopaedic Special Tests  Positive  Negative  NT Comments    Velia Jones           Sharps-Darryl   x       Cervical Torsion / Body Rotation    x       C2 Kick   x        Modified Shear           Compression   x       Distraction    x                          RESTRICTIONS/PRECAUTIONS:     Exercises/Interventions:   Therapeutic Exercise (41169) or [id number] Resistance / level Sets/sec Reps Notes   UBE 1.0   Painful - stopped    pulleys        Chin tucks      Upper trap stretch      Post shoulder stretch       Doorway pec stretch    Hands high x3  Hands low x3    Standing thread the needle       S/L open book      TB:   Mid row  Ext   tricep ext  LPD   LIME      No money ER LIME 20    Wall push up with serratus +  10    Chin tuck with BUE flexion to 90       Thoracic ext sitting at chair    Towel at T6   Sitting cervical AROM SB    Pt Hands stabilizing C5-7   Supine flexion AROM      Supine Foam roll chest openers stretch - hands low  UE at 90 90   ''     Supine scalene stretch        Bridge   5''x10     SLR flexion  abd  x10 B  x10 B     IB S  2x30''     HSS at EOT  2x30''     Hip flexor stretch at stairs  2x30''     Piriformis stretch   2x30''     TG squats  3x10     SL clamshell LIME 2x10 B     SB LTR  2x10     SB DKC  5''x20                                                      Therapeutic Activities (29396)       diaphramic breathing in supine     Min cues to perform 1/25    progress note/reassessment     1/25   Re-assessment for LBP/HIPS    2/10                                                                                NMR re-education (42857)       Prone chin tucks       Prone scap retraction                            Median Nerve flossing   Ulnar nerve flossing               TrA bracing in supine  10''x10     TrA with PPT  10''x10     Supine head lifts   Side lying head lifts  10''x10  10''x10 B     Pelvic tilts on SB with TrA actvation A/P, M/L, CW/CWW  x30     LTR with SB and TrA activation   X20     multifidi rows 2 plates 0R51 B     Mid row  4 plates 3I50      LPD 4 plates 0Z14 Manual Intervention (74600)       STM to upper trap, SOR, gentle distraction to cervical spine, pain free PROM                                              Patient education:  -pt educated on diagnosis, prognosis and expectations for rehab  -all pt questions were answered    Home Exercise Program:  Access Code: [email or web address]   URL: [employer]/   Date: 01/04/2021   Prepared by: Caresse Renato     Exercises   Seated Scapular Retraction - 10 reps - 3 sets - 1x daily - 7x weekly   Seated Cervical Retraction - 10 reps - 3 sets - 1x daily - 7x weekly   Standing Isometric Cervical Flexion with Manual Resistance - 10 reps - 3 sets - 1x daily - 7x weekly   Standing Isometric Cervical Extension with Manual Resistance - 10 reps - 3 sets - 1x daily - 7x weekly   Seated Isometric Cervical Sidebending - 10 reps - 3 sets - 1x daily - 7x weekly     Access Code: [email or web address]   URL: [employer]/   Date: 01/14/2021   Prepared by: Caresse Renato     Exercises   Seated Levator Scapulae Stretch - 10 reps - 3 sets - 1x daily - 7x weekly   Seated Cervical Sidebending Stretch - 10 reps - 3 sets - 1x daily - 7x weekly   Doorway Pec Stretch at 90 Degrees Abduction - 10 reps - 3 sets - 1x daily - 7x weekly   Standing Shoulder Posterior Capsule Stretch - 10 reps - 3 sets - 1x daily - 7x weekly       Access Code: [email or web address]   URL: [employer]/   Date: 01/18/2021   Prepared by: Caresse Renato     Exercises   Sidelying Thoracic Rotation - 10 reps - 3 sets - 1x daily - 7x weekly   Median Nerve Flossing - Tray - 10 reps - 3 sets - 1x daily - 7x weekly   Standing Radial Nerve Glide - 10 reps - 3 sets - 1x daily - 7x weekly   Ulnar Nerve Flossing - 10 reps - 3 sets - 1x daily - 7x weekly     Access Code: [email or web address]   URL: [email or web address]   Date: 01/21/2021   Prepared by: Caresse [place]     Exercises   Supine Diaphragmatic Breathing - 10 reps - 3 sets - 1x daily - 7x weekly Seated Thoracic Self-Mobilization - 10 reps - 3 sets - 1x daily - 7x weekly     Access Code: [email or web address]   URL: [employer]/   Date: 02/10/2021   Prepared by: Susan      Exercises   Supine Bridge - 10 reps - 3 sets - 1x daily - 7x weekly   Supine Active Straight Leg Raise - 10 reps - 3 sets - 1x daily - 7x weekly   Sidelying Hip Abduction - 10 reps - 3 sets - 1x daily - 7x weekly   Seated Hamstring Stretch - 10 reps - 3 sets - 1x daily - 7x weekly     Access Code: [email or web address]   URL: [employer]/   Date: 02/18/2021   Prepared by: Susan      Exercises   Standing Hip Flexor Stretch - 10 reps - 3 sets - 1x daily - 7x weekly   Seated Piriformis Stretch with Trunk Bend - 10 reps - 3 sets - 1x daily - 7x weekly   Standing Gastroc Stretch - 10 reps - 3 sets - 1x daily - 7x weekly           Therapeutic Exercise and NMR EXR  [x] (04371) Provided verbal/tactile cueing for activities related to strengthening, flexibility, endurance, ROM  for improvements in cervical, postural, scapular, scapulothoracic and UE control with self care, reaching, carrying, lifting, house/yardwork, driving/computer work.    [] (87834) Provided verbal/tactile cueing for activities related to improving balance, coordination, kinesthetic sense, posture, motor skill, proprioception  to assist with cervical, scapular, scapulothoracic and UE control with self care, reaching, carrying, lifting, house/yardwork, driving/computer work.  [] (04941) Therapist is in constant attendance of 2 or more patients providing skilled therapy interventions, but not providing any significant amount of measurable one-on-one time to either patient, for improvements in cervical, scapular, scapulothoracic and UE control with self care, reaching, carrying, lifting, house/yardwork, driving, computer work.      Therapeutic Activities:    [x] (82027 or 62485) Provided verbal/tactile cueing for activities related to improving balance, To be achieved in: 2 weeks  1. Independent in HEP and progression per patient tolerance, in order to prevent re-injury. [x] Progressing: [] Met: [] Not Met: [] Adjusted  2. Patient will have a decrease in pain to facilitate improvement in movement, function, and ADLs as indicated by Functional Deficits. [x] Progressing: [] Met: [] Not Met: [] Adjusted    Long Term Goals: To be achieved in: 6 weeks  1. Disability index score of 15% or less for the NDI to assist with reaching prior level of function. [x] Progressing: [] Met: [] Not Met: [] Adjusted  2. Patient will demonstrate increased AROM to [facility] of cervical/thoracic spine to allow for proper joint functioning as indicated by patients Functional Deficits. [x] Progressing: [] Met: [] Not Met: [] Adjusted  3. Patient will demonstrate an increase in postural awareness and control and activation of  Deep cervical stabilizers to allow for proper functional mobility as indicated by patients Functional Deficits. [x] Progressing: [] Met: [] Not Met: [] Adjusted  4. Patient will return to functional activities including work, lifting, without increased symptoms or restriction. [x] Progressing: [] Met: [] Not Met: [] Adjusted   5. Patient will improve B hip strength to 4+/5 in order to decrease pain and improve function. [x] Progressing: [] Met: [] Not Met: [] Adjusted  6. Patient will improve core strength and mobility for improved function by scoring 20% or less on JUAN PABLO. [x] Progressing: [] Met: [] Not Met: [] Adjusted    Overall Progression Towards Functional goals/ Treatment Progress Update:  [] Patient is progressing as expected towards functional goals listed. [x] Progression is slowed due to complexities/Impairments listed. [] Progression has been slowed due to co-morbidities.   [] Plan just implemented, too soon to assess goals progression <30days   [] Goals require adjustment due to lack of progress  [] Patient is not progressing as expected and requires additional follow up with physician  [] Other    Persisting Functional Limitations/Impairments:  []Sitting []Standing   []Walking []Squatting/bending    []Stairs []ADL's    []Transfers []Reaching  [x]Housework [x]Lifting  []Driving [x]Job related tasks  [x]Sports/Recreation : disc golf  [x]Sleeping  []Other:    ASSESSMENT:     Progressions added in bold above, pt tolerated well and able to complete with short rest breaks in between reps. Treatment/Activity Tolerance:  [x] Patient able to complete tx  [] Patient limited by fatique  [x] Patient limited by pain  [] Patient limited by other medical complications  [] Other:      Prognosis: [x] Good [] Fair  [] Poor    Patient Requires Follow-up: [x] Yes  [] No    PLAN: See eval. PT 1-2x / week for 6 weeks. TOS, decrease upper trap recruitment, proper breathing, decrease neural tension. Pt insurance does not cover dry needling . 2/10 - ADD HIP STRENGTHENING , LUMBAR STABILITY , CORE STRENGTH . If pt does not progress - aquatic therapy? [x] Continue per plan of care [] Alter current plan (see comments)  [] Plan of care initiated [] Hold pending MD visit [] Discharge    Electronically signed by: Nancy Marks, PT       Note: If patient does not return for scheduled/ recommended follow up visits, this note will serve as a discharge from care along with most recent update on progress.

## 2021-03-03 ENCOUNTER — HOSPITAL ENCOUNTER (OUTPATIENT)
Dept: PHYSICAL THERAPY | Age: 46
Setting detail: THERAPIES SERIES
Discharge: HOME | End: 2021-03-03
Payer: MEDICARE

## 2021-03-03 PROCEDURE — 97110 THERAPEUTIC EXERCISES: CPT

## 2021-03-03 PROCEDURE — 97112 NEUROMUSCULAR REEDUCATION: CPT

## 2021-03-03 NOTE — FLOWSHEET NOTE
[address] [facility]  Phone: [phone]   Fax: [phone]      Physical Therapy Treatment Note/ Progress Report:     Date:  3/3/2021    Patient Name:  Nazia Tran    :  1975  MRN: [id number]  Restrictions/Precautions:    Medical/Treatment Diagnosis Information:  Diagnosis: S16. 1XXA (ICD-10-CM) - Strain of neck muscle, initial encounter , P20.490E (ICD-10-CM) - Strain of left shoulder, initial encounter, M16.0 (ICD-10-CM) - Bilateral hip joint arthritis  Treatment Diagnosis: Impaired posture, increased muscle tension, decreased cervical ROM strength, BUE decreased strength, decrease hip/core strength, low back pain. Insurance/Certification information:  PT Insurance Information: [place]  Physician Information:  Referring Practitioner: Anuja Hutchins MD , MIGUELITO Boggs  Plan of care signed (Y/N): [x]  Yes []  No     Date of Patient follow up with Physician:      Progress Report: []  Yes  [x]  No     Date Range for reporting period:  Beginnin21  Endin21    Progress report due (10 Rx/or 30 days whichever is less):  0     Recertification due (POC duration/ or 90 days whichever is less):  3/24/21    Visit # Insurance Allowable Auth required? Date Range    30 []  Yes  [x]  No      Latex Allergy:  [x]NO      []YES  Preferred Language for Healthcare:   [x]English       []other:    Functional Scale:         Date assessed:  NDI: raw score = 27; dysfunction = 54%    21    Pain level:  3/10 mid back ,, neck , 6/10 low back , hips 0/10      SUBJECTIVE:   Pt reports on Monday he sneezed and really hurt his mid back and is sore from it.      OBJECTIVE:     2/10: Lumbar ROM - 120 flexion, 40 ext, SB 30 B , rotation ~10% limited bilaterally , B STRENGTH: hip flexion - 3+/5, hip abd 3+/5, hip ext 3+/5 , knee ext 4/5, knee flexion 4/5    Special tests: LUZ MARINA + on R , - standing flexion test, - for SI involvement, normal lumbar joint mobility , mild hamstring tightness B      1/25    Observation: upper trap over activation , rounded shoulders, FAIR posture     Test measurements:     1/25/21    EAST test - + for parathesias in B hands after 20 seconds. Vascular refill in B hands - normal     Dermatomes Normal Abnormal Comments   Top of head (C1)         Posterior occipital region (C2)         Side of neck (C3)         Top of shoulder (C4) x       Lateral deltoid (C5) x       Tip of thumb (C6) x       Distal middle finger (C7) x       Distal fifth finger (C8) x       Medial forearm (T1) x       Lower extremity               Reflexes Normal Abnormal Comments   C5-6 Biceps x       C5-6 Brachioradialis x       C7-8 Triceps x       Linaress x       S1-2 Seated achilles x        S1-2 Prone knee bend         L3-4 Patellar tendon x        Clonus x        Babinski               CERV ROM L R L 1/25 R 1/25   Cervical Flexion 35   35   Cervical Extension 42   40   Cervical SB 25 27 25 25   Cervical rotation 50 55 45 45            ROM Left Right L 1/25 R 1/25   Shoulder Flex WNL WNL WNL WNL   Shoulder Abd WNL WNL WNL WNL   Shoulder ER C2 C2     Shoulder IR T8 L4                       Strength / Myotomes Left Right L 1/25 R 1/25   Cervical Flexion (C1-2)        Cervical Side-bending (C3)        Shoulder Shrug (C4)        Shoulder Flex 4 4 4 4   Shoulder Scap        Shoulder Abduction (C5) 4 4 4 4   Shoulder ER 4- 4- 4- 4-   Shoulder IR 4 4 4 4   Biceps (C6) 4 4 4 4   Triceps (C7) 4 4 4 4   Wrist Extension (C6)        Wrist Flexion (C7)                 Thumb Abduction (C8)        Finger Abduction (T1)           Lower extremity myotomes:   [x]? Normal                      []?Abnormal      Joint mobility:  mid thoracic               []?Normal                      [x]? Hypo              []?Hyper     Orthopaedic Special Tests  Positive  Negative  NT Comments    Hashirley's            Rhomberg           Sharps-Darryl   x       Cervical Torsion / Body Rotation    x       C2 Kick   x        Modified Shear           Compression   x       Distraction    x                          RESTRICTIONS/PRECAUTIONS:     Exercises/Interventions:   Therapeutic Exercise (21477) or [id number] Resistance / level Sets/sec Reps Notes   UBE 1.0   Painful - stopped    pulleys        Chin tucks      Upper trap stretch      Post shoulder stretch       Doorway pec stretch    Hands high x3  Hands low x3    Standing thread the needle       S/L open book      TB:   Mid row  Ext   tricep ext  LPD   LIME      No money ER LIME 20    Wall push up with serratus +  10    Chin tuck with BUE flexion to 90       Thoracic ext sitting at chair    Towel at T6   Sitting cervical AROM SB    Pt Hands stabilizing C5-7   Supine flexion AROM      Supine Foam roll chest openers stretch - hands low  UE at 90 90   ''     Supine scalene stretch        Bridge with ADD squeeze  5''x20     SLR flexion  abd  x10 B  x10 B     IB S  2x30''     HSS at EOT  2x30''     Hip flexor stretch at stairs  2x30''     Piriformis stretch   2x30''     TG squats  3x10     SL clamshell LIME 2x10 B     SB LTR  2x10     SB DKC  5''x20     Standing SB EXT, SB    With TrA hold                                             Therapeutic Activities (75035)       diaphramic breathing in supine     Min cues to perform 1/25    progress note/reassessment     1/25   Re-assessment for LBP/HIPS    2/10                                                                                NMR re-education (90570)       Prone chin tucks       Prone scap retraction                            Median Nerve flossing   Ulnar nerve flossing               TrA bracing in supine  10''x10     TrA with PPT  10''x10     Supine head lifts   Side lying head lifts  10''x10  10''x10 B     Pelvic tilts on SB with TrA actvation A/P, M/L, CW/CWW  x30     LTR with SB and TrA activation   X20     multifidi rows 2 plates 9L36 B     Mid row  4 plates 6I38      LPD 4 plates 2I59 Manual Intervention (06807)       STM to upper trap, SOR, gentle distraction to cervical spine, pain free PROM                                              Patient education:  -pt educated on diagnosis, prognosis and expectations for rehab  -all pt questions were answered    Home Exercise Program:  Access Code: [email or web address]   URL: [email or web address]   Date: 01/04/2021   Prepared by: Wyonia Negra     Exercises   Seated Scapular Retraction - 10 reps - 3 sets - 1x daily - 7x weekly   Seated Cervical Retraction - 10 reps - 3 sets - 1x daily - 7x weekly   Standing Isometric Cervical Flexion with Manual Resistance - 10 reps - 3 sets - 1x daily - 7x weekly   Standing Isometric Cervical Extension with Manual Resistance - 10 reps - 3 sets - 1x daily - 7x weekly   Seated Isometric Cervical Sidebending - 10 reps - 3 sets - 1x daily - 7x weekly     Access Code: [email or web address]   URL: [email or web address]   Date: 01/14/2021   Prepared by: Wyonia Negra     Exercises   Seated Levator Scapulae Stretch - 10 reps - 3 sets - 1x daily - 7x weekly   Seated Cervical Sidebending Stretch - 10 reps - 3 sets - 1x daily - 7x weekly   Doorway Pec Stretch at 90 Degrees Abduction - 10 reps - 3 sets - 1x daily - 7x weekly   Standing Shoulder Posterior Capsule Stretch - 10 reps - 3 sets - 1x daily - 7x weekly       Access Code: [email or web address]   URL: [email or web address]   Date: 01/18/2021   Prepared by: Wyonia Negra     Exercises   Sidelying Thoracic Rotation - 10 reps - 3 sets - 1x daily - 7x weekly   Median Nerve Flossing - Tray - 10 reps - 3 sets - 1x daily - 7x weekly   Standing Radial Nerve Glide - 10 reps - 3 sets - 1x daily - 7x weekly   Ulnar Nerve Flossing - 10 reps - 3 sets - 1x daily - 7x weekly     Access Code: [email or web address]   URL: [email or web address]   Date: 01/21/2021   Prepared by: Wyonia Negra     Exercises   Supine Diaphragmatic Breathing - 10 reps - 3 sets - 1x daily - 7x weekly   Seated Thoracic Self-Mobilization - 10 reps - 3 sets - 1x daily - 7x weekly     Access Code: [email or web address]   URL: [email or web address]/   Date: 02/10/2021   Prepared by: Lauren Martin     Exercises   Supine Bridge - 10 reps - 3 sets - 1x daily - 7x weekly   Supine Active Straight Leg Raise - 10 reps - 3 sets - 1x daily - 7x weekly   Sidelying Hip Abduction - 10 reps - 3 sets - 1x daily - 7x weekly   Seated Hamstring Stretch - 10 reps - 3 sets - 1x daily - 7x weekly     Access Code: [email or web address]   URL: [email or web address]/   Date: 02/18/2021   Prepared by: Lauren Martin     Exercises   Standing Hip Flexor Stretch - 10 reps - 3 sets - 1x daily - 7x weekly   Seated Piriformis Stretch with Trunk Bend - 10 reps - 3 sets - 1x daily - 7x weekly   Standing Gastroc Stretch - 10 reps - 3 sets - 1x daily - 7x weekly           Therapeutic Exercise and NMR EXR  [x] (61256) Provided verbal/tactile cueing for activities related to strengthening, flexibility, endurance, ROM  for improvements in cervical, postural, scapular, scapulothoracic and UE control with self care, reaching, carrying, lifting, house/yardwork, driving/computer work.    [] (54395) Provided verbal/tactile cueing for activities related to improving balance, coordination, kinesthetic sense, posture, motor skill, proprioception  to assist with cervical, scapular, scapulothoracic and UE control with self care, reaching, carrying, lifting, house/yardwork, driving/computer work.  [] (94728) Therapist is in constant attendance of 2 or more patients providing skilled therapy interventions, but not providing any significant amount of measurable one-on-one time to either patient, for improvements in cervical, scapular, scapulothoracic and UE control with self care, reaching, carrying, lifting, house/yardwork, driving, computer work.      Therapeutic Activities:    [x] (68368 or 81487) Provided verbal/tactile cueing for activities related to improving balance, coordination, kinesthetic sense, posture, motor skill, proprioception and motor activation to allow for proper function of cervical, scapular, scapulothoracic and UE control with self care, carrying, lifting, driving/computer work. Home Exercise Program:    [x] (01968) Reviewed/Progressed HEP activities related to strengthening, flexibility, endurance, ROM of cervical, scapular, scapulothoracic and UE control with self care, reaching, carrying, lifting, house/yardwork, driving/computer work  [] (08366) Reviewed/Progressed HEP activities related to improving balance, coordination, kinesthetic sense, posture, motor skill, proprioception of cervical, scapular, scapulothoracic and UE control with self care, reaching, carrying, lifting, house/yardwork, driving/computer work      Manual Treatments:  PROM / STM / Oscillations-Mobs:  G-I, II, III, IV (PA's, Inf., Post.)  [] (94748) Provided manual therapy to mobilize soft tissue/joints of cervical/CT, scapular GHJ and UE for the purpose of decreasing headache, modulating pain, promoting relaxation,  increasing ROM, reducing/eliminating soft tissue swelling/inflammation/restriction, improving soft tissue extensibility and allowing for proper ROM for normal function with self care, reaching, carrying, lifting, house/yardwork, driving/computer work    Modalities:  I    Charges:  Timed Code Treatment Minutes: 43   Total Treatment Minutes: 43       [] EVAL - LOW (90808)   [] EVAL - MOD (84151)  [] EVAL - HIGH (63412)  [] RE-EVAL (37751)  [x] IY(16470) x 2       [x] NMR (38443) x 1   [] Ionto  [] Manual (01777) x 1       [] Ultrasound  [] TA x 2    [] Mech Traction (36127)  [] Home Management Training x   [] ES (un) (36994):           [] Aquatic    [] ES(attended) (13701)   [] Group    [] Other:    GOALS:  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  Independent in HEP and progression per patient tolerance, in order to prevent re-injury. [x] Progressing: [] Met: [] Not Met: [] Adjusted  2. Patient will have a decrease in pain to facilitate improvement in movement, function, and ADLs as indicated by Functional Deficits. [x] Progressing: [] Met: [] Not Met: [] Adjusted    Long Term Goals: To be achieved in: 6 weeks  1. Disability index score of 15% or less for the NDI to assist with reaching prior level of function. [x] Progressing: [] Met: [] Not Met: [] Adjusted  2. Patient will demonstrate increased AROM to [facility] of cervical/thoracic spine to allow for proper joint functioning as indicated by patients Functional Deficits. [x] Progressing: [] Met: [] Not Met: [] Adjusted  3. Patient will demonstrate an increase in postural awareness and control and activation of  Deep cervical stabilizers to allow for proper functional mobility as indicated by patients Functional Deficits. [x] Progressing: [] Met: [] Not Met: [] Adjusted  4. Patient will return to functional activities including work, lifting, without increased symptoms or restriction. [x] Progressing: [] Met: [] Not Met: [] Adjusted   5. Patient will improve B hip strength to 4+/5 in order to decrease pain and improve function. [x] Progressing: [] Met: [] Not Met: [] Adjusted  6. Patient will improve core strength and mobility for improved function by scoring 20% or less on JUAN PABLO. [x] Progressing: [] Met: [] Not Met: [] Adjusted    Overall Progression Towards Functional goals/ Treatment Progress Update:  [] Patient is progressing as expected towards functional goals listed. [x] Progression is slowed due to complexities/Impairments listed. [] Progression has been slowed due to co-morbidities.   [] Plan just implemented, too soon to assess goals progression <30days   [] Goals require adjustment due to lack of progress  [] Patient is not progressing as expected and requires additional follow up with physician  []

## 2021-03-08 ENCOUNTER — HOSPITAL ENCOUNTER (OUTPATIENT)
Dept: PHYSICAL THERAPY | Age: 46
Setting detail: THERAPIES SERIES
Discharge: HOME | End: 2021-03-08
Payer: MEDICARE

## 2021-03-08 PROCEDURE — 97110 THERAPEUTIC EXERCISES: CPT

## 2021-03-08 PROCEDURE — 97530 THERAPEUTIC ACTIVITIES: CPT

## 2021-03-08 NOTE — FLOWSHEET NOTE
[address] [facility]  Phone: [phone]   Fax: [phone]       Physical Therapy Re-Certification Plan of Care    Dear   Azell Mcburney , PA    We had the pleasure of treating the following patient for physical therapy services at [facility]. A summary of our findings can be found in the updated assessment below. This includes our plan of care. If you have any questions or concerns regarding these findings, please do not hesitate to contact me at the office phone number checked above. Thank you for the referral.     Physician Signature:________________________________Date:__________________  By signing above (or electronic signature), therapists plan is approved by physician        Overall Response to Treatment:   []Patient is responding well to treatment and improvement is noted with regards  to goals   []Patient should continue to improve in reasonable time if they continue HEP   []Patient has plateaued and is no longer responding to skilled PT intervention    []Patient is getting worse and would benefit from return to referring MD   []Patient unable to adhere to initial POC   [x]Other: Pt is making slow and steady progress with skilled PT services as seen by improved ROM/ strength/ and progress toward goals. Pt with chronic pain and arthritis that is limiting his function. Due to treating multiple areas of the body - PT recommending aquatic therapy to further strengthening and improve pt tolerance to exercises/stretches. Date range of Visits: 21-3/8/21  Total Visits: 17    Recommendation:    [x]Continue PT 1-2x / wk for 8 weeks. Aquatic therapy    []Hold PT, pending MD visit        Physical Therapy Treatment Note/ Progress Report:     Date:  3/8/2021    Patient Name:  Norma Turk    :  1975  MRN: [id number]  Restrictions/Precautions:    Medical/Treatment Diagnosis Information:  Diagnosis: S16. Gordy Sandoval (ICD-10-CM) - Strain of neck muscle, initial encounter , U47.080S (ICD-10-CM) - Strain of left shoulder, initial encounter, M16.0 (ICD-10-CM) - Bilateral hip joint arthritis  Treatment Diagnosis: Impaired posture, increased muscle tension, decreased cervical ROM strength, BUE decreased strength, decrease hip/core strength, low back pain. Insurance/Certification information:  PT Insurance Information: [place]  Physician Information:  Referring Practitioner: Sher Osman MD , MIGUELITO Guzmán  Plan of care signed (Y/N): [x]  Yes []  No     Date of Patient follow up with Physician:      Progress Report: [x]  Yes  []  No     Date Range for reporting period:  Beginnin21  Endin21    Progress report due (10 Rx/or 30 days whichever is less):  48    Recertification due (POC duration/ or 90 days whichever is less):  5/3/21    Visit # Insurance Allowable Auth required? Date Range   17 30 []  Yes  [x]  No      Latex Allergy:  [x]NO      []YES  Preferred Language for Healthcare:   [x]English       []other:    Functional Scale:         Date assessed:  NDI: raw score = 27; dysfunction = 54%    21    Pain level:  4/10 neck , 3/10 low back , hips 4/10  - achy     SUBJECTIVE:    Pt reports just some slight achy-ness this morning. Definitely  feeling better gradually. Agrees to try aquatic therapy to continue progress.  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  Vascular refill in B hands - normal               CERV ROM L R L 1/25 R 1/25   Cervical Flexion 35   35   Cervical Extension 42   40   Cervical SB 25 27 25 25   Cervical rotation 50 55 45 45            ROM Left Right L 1/25 R 1/25   Shoulder Flex WNL WNL WNL WNL   Shoulder Abd WNL WNL WNL WNL   Shoulder ER C2 C2     Shoulder IR T8 L4                       Strength / Myotomes Left Right L 1/25 R 1/25   Cervical Flexion (C1-2)        Cervical Side-bending (C3)        Shoulder Shrug (C4)        Shoulder Flex 4 4 4 4   Shoulder Scap        Shoulder Abduction (C5) 4 4 4 4   Shoulder ER 4- 4- 4- 4-   Shoulder IR 4 4 4 4   Biceps (C6) 4 4 4 4   Triceps (C7) 4 4 4 4   Wrist Extension (C6)        Wrist Flexion (C7)                 Thumb Abduction (C8)        Finger Abduction (T1)                   RESTRICTIONS/PRECAUTIONS:     Exercises/Interventions:   Therapeutic Exercise (13395) or [id number] Resistance / level Sets/sec Reps Notes   UBE 1.0   Painful - stopped    pulleys        Chin tucks      Upper trap stretch      Post shoulder stretch       Doorway pec stretch    Hands high x3  Hands low x3    Standing thread the needle       S/L open book      TB:   Mid row  Ext   tricep ext  LPD   LIME      No money ER LIME 20    Wall push up with serratus +  10    Chin tuck with BUE flexion to 90       Thoracic ext sitting at chair    Towel at T6   Sitting cervical AROM SB    Pt Hands stabilizing C5-7   Supine flexion AROM      Supine Foam roll chest openers stretch - hands low  UE at 90 90   ''     Supine scalene stretch        Bridge with ADD squeeze  5''x20     SLR flexion  abd  x10 B  x10 B     IB S  2x30''     HSS at EOT  2x30''     Hip flexor stretch at stairs  2x30''     Piriformis stretch   2x30''     TG squats  3x10     SL clamshell LIME 2x10 B     SB LTR  2x10     SB DKC  5''x20     Standing SB EXT, SB With TrA hold                                             Therapeutic Activities (02905)       diaphramic breathing in supine     Min cues to perform 1/25    progress note/reassessment , objective measures  x30 min   3/8   Re-assessment for LBP/HIPS    2/10                                                                                NMR re-education (56913)       Prone chin tucks       Prone scap retraction                            Median Nerve flossing   Ulnar nerve flossing               TrA bracing in supine  10''x10     TrA with PPT  10''x10     Supine head lifts   Side lying head lifts  10''x10  10''x10 B     Pelvic tilts on SB with TrA actvation A/P, M/L, CW/CWW  x30     LTR with SB and TrA activation   X20     multifidi rows 2 plates 2E14 B     Mid row  4 plates 5F48      LPD 4 plates 1H41                                               Manual Intervention (28084)       STM to upper trap, SOR, gentle distraction to cervical spine, pain free PROM                                              AquaticTherapy Dates of Service:   Aquatic Visits Exercises/Activities:   Transfers:          % Immersion:            Ambulation:   UE Exercises:       Forwards   xx Shoulder Shrugs      Lateral   xx Shoulder Circles  xx    Retro   xx Scapular Retraction   xx    Marching   Push Downs       [place]   Punching     Jog    Rowing     Multifidi walkouts with paddle   Elbow Flex/Ext       Shldr Flex/Ext xx      Shldr aBd/aDd xx   LE Exercises:  Shldr Horiz aBd/aDd xx   HR/TR xx Shldr IR/ER    Marches xx Arm Circles xx   Squats  xx PNF Diagonals    Hamstring Curls xx Wall Push Ups xx   Hip Flexion (SLR) xx Figure 8's    Hip aBduction (SLR) xx Bilateral Pull Downs    Hip Extension (SLR) xx      Hip aDduction (SLR)      Hip Circles  Functional:    Hip IR/Er  Step up forward xx   TrA Set  xx Step up lateral  xx   Pelvic Tilts  xx Step down     Fig 8's   Lunges Forward    LE PNF  Lunges Retro      Lunges Lateral     Balance: Lower ab curl with noodle     SLS        Tandem Stance       NBOS eyes open  Seated:     NBOS eyes closed  Ankle pumps     Hand to Opposite Knee  Ankle Circles     Fwd Step ups to SLS  Knee Flex/Ext    Lateral Step ups to SLS  Hip aBd/aDd    Stop/Go Gait   Bicycle       Ankle DF/PF      Ankle Inv/Ev    Stretching:       Gastroc/Soleus xx     Hamstring  xx Noodle:     Knee Flex Stretch  Leg Press    Piriformis  xx Noodle Hang at [place]    Hip Flexor xx Noodle Hang Deep Water    SKTC  Noodle Bicycle     DKTC       ITB      Quad xx Deep Water:    Mid Back  xx Jog    UT  Jumping Jacks    Post Shoulder  Heel to Toe    Ladder Pull  Hand to Opposite Knee    Pec Stretch xx Rocking Horse      [employer] Skier          Cervical:   Other:     AROM Flexion xx     AROM Extension xx     AROM Side Bending xxx     AROM Rotation xx     Chin Tucks      Chin Nods        Aquatic Abbreviation Key  B= Belt DB= Dumbells T= Theratube   H= Hydrotone N= Noodles W= Weights   P= Paddles S= Speedo equipment K= Kickboard         Patient education:  -pt educated on diagnosis, prognosis and expectations for rehab  -all pt questions were answered    Home Exercise Program:  Access Code: [email or web address]   URL: [email or web address]   Date: 01/04/2021   Prepared by: Ellens Slot     Exercises   Seated Scapular Retraction - 10 reps - 3 sets - 1x daily - 7x weekly   Seated Cervical Retraction - 10 reps - 3 sets - 1x daily - 7x weekly   Standing Isometric Cervical Flexion with Manual Resistance - 10 reps - 3 sets - 1x daily - 7x weekly   Standing Isometric Cervical Extension with Manual Resistance - 10 reps - 3 sets - 1x daily - 7x weekly   Seated Isometric Cervical Sidebending - 10 reps - 3 sets - 1x daily - 7x weekly     Access Code: [email or web address]   URL: [email or web address]   Date: 01/14/2021   Prepared by: Ples Slot     Exercises   Seated Levator Scapulae Stretch - 10 reps - 3 sets - 1x daily - 7x weekly   Seated Cervical Sidebending Stretch - 10 reps - 3 sets - 1x daily - 7x weekly   Doorway Pec Stretch at 90 Degrees Abduction - 10 reps - 3 sets - 1x daily - 7x weekly   Standing Shoulder Posterior Capsule Stretch - 10 reps - 3 sets - 1x daily - 7x weekly       Access Code: [email or web address]   URL: [facility]/   Date: 01/18/2021   Prepared by: Ples Slot     Exercises   Sidelying Thoracic Rotation - 10 reps - 3 sets - 1x daily - 7x weekly   Median Nerve Flossing - Tray - 10 reps - 3 sets - 1x daily - 7x weekly   Standing Radial Nerve Glide - 10 reps - 3 sets - 1x daily - 7x weekly   Ulnar Nerve Flossing - 10 reps - 3 sets - 1x daily - 7x weekly     Access Code: [email or web address]   URL: [email or web address]   Date: 01/21/2021   Prepared by: Ples Slot     Exercises   Supine Diaphragmatic Breathing - 10 reps - 3 sets - 1x daily - 7x weekly   Seated Thoracic Self-Mobilization - 10 reps - 3 sets - 1x daily - 7x weekly     Access Code: [email or web address]   URL: [facility]/   Date: 02/10/2021   Prepared by: Ples Slot     Exercises   Supine Bridge - 10 reps - 3 sets - 1x daily - 7x weekly   Supine Active Straight Leg Raise - 10 reps - 3 sets - 1x daily - 7x weekly   Sidelying Hip Abduction - 10 reps - 3 sets - 1x daily - 7x weekly   Seated Hamstring Stretch - 10 reps - 3 sets - 1x daily - 7x weekly     Access Code: [email or web address]   URL: [facility]/   Date: 02/18/2021   Prepared by: Ples Slot     Exercises   Standing Hip Flexor Stretch - 10 reps - 3 sets - 1x daily - 7x weekly   Seated Piriformis Stretch with Trunk Bend - 10 reps - 3 sets - 1x daily - 7x weekly   Standing Gastroc Stretch - 10 reps - 3 sets - 1x daily - 7x weekly           Therapeutic Exercise and NMR EXR  [x] (04224) Provided verbal/tactile cueing for activities related to strengthening, flexibility, endurance, ROM  for improvements in cervical, postural, scapular, scapulothoracic and UE control with self care, reaching, carrying, lifting, house/yardwork, driving/computer work.    [] (16078) Provided verbal/tactile cueing for activities related to improving balance, coordination, kinesthetic sense, posture, motor skill, proprioception  to assist with cervical, scapular, scapulothoracic and UE control with self care, reaching, carrying, lifting, house/yardwork, driving/computer work.  [] (68574) Therapist is in constant attendance of 2 or more patients providing skilled therapy interventions, but not providing any significant amount of measurable one-on-one time to either patient, for improvements in cervical, scapular, scapulothoracic and UE control with self care, reaching, carrying, lifting, house/yardwork, driving, computer work. Therapeutic Activities:    [x] (30322 or 35751) Provided verbal/tactile cueing for activities related to improving balance, coordination, kinesthetic sense, posture, motor skill, proprioception and motor activation to allow for proper function of cervical, scapular, scapulothoracic and UE control with self care, carrying, lifting, driving/computer work.      Home Exercise Program:    [x] (57732) Reviewed/Progressed HEP activities related to strengthening, flexibility, endurance, ROM of cervical, scapular, scapulothoracic and UE control with self care, reaching, carrying, lifting, house/yardwork, driving/computer work  [] (65562) Reviewed/Progressed HEP activities related to improving balance, coordination, kinesthetic sense, posture, motor skill, proprioception of cervical, scapular, scapulothoracic and UE control with self care, reaching, carrying, lifting, house/yardwork, driving/computer work      Manual Treatments:  PROM / STM / Oscillations-Mobs:  G-I, II, III, IV (PA's, Inf., Post.)  [] (28696) Provided manual therapy to mobilize soft tissue/joints of cervical/CT, scapular GHJ and UE for the purpose of decreasing headache, modulating pain, promoting relaxation,  increasing ROM, reducing/eliminating soft tissue swelling/inflammation/restriction, improving soft tissue extensibility and allowing for proper ROM for normal function with self care, reaching, carrying, lifting, house/yardwork, driving/computer work    Modalities:  I    Charges:  Timed Code Treatment Minutes: 43   Total Treatment Minutes: 43       [] EVAL - LOW (85461)   [] EVAL - MOD (43764)  [] EVAL - HIGH (38827)  [] RE-EVAL (74714)  [x] PF(98406) x 1      [] NMR (98428) x 1   [] Ionto  [] Manual (32376) x 1       [] Ultrasound  [x] TA x 2    [] Mech Traction (23855)  [] Home Management Training x   [] ES (un) (29760):           [] Aquatic    [] ES(attended) (86190)   [] Group    [] Other:    GOALS:  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 Independent in HEP and progression per patient tolerance, in order to prevent re-injury. [x] Progressing: [] Met: [] Not Met: [] Adjusted  2. Patient will have a decrease in pain to facilitate improvement in movement, function, and ADLs as indicated by Functional Deficits. [x] Progressing: [] Met: [] Not Met: [] Adjusted    Long Term Goals: To be achieved in: 6 weeks  1. Disability index score of 15% or less for the NDI to assist with reaching prior level of function. [x] Progressing: [] Met: [] Not Met: [] Adjusted  2. Patient will demonstrate increased AROM to [facility] of cervical/thoracic spine to allow for proper joint functioning as indicated by patients Functional Deficits. [x] Progressing: [] Met: [] Not Met: [] Adjusted  3. Patient will demonstrate an increase in postural awareness and control and activation of  Deep cervical stabilizers to allow for proper functional mobility as indicated by patients Functional Deficits. [x] Progressing: [] Met: [] Not Met: [] Adjusted  4. Patient will return to functional activities including work, lifting, without increased symptoms or restriction. [x] Progressing: [] Met: [] Not Met: [] Adjusted   5. Patient will improve B hip strength to 4+/5 in order to decrease pain and improve function. [x] Progressing: [] Met: [] Not Met: [] Adjusted  6. Patient will improve core strength and mobility for improved function by scoring 20% or less on JUAN PABLO. [x] Progressing: [] Met: [] Not Met: [] Adjusted    Overall Progression Towards Functional goals/ Treatment Progress Update:  [] Patient is progressing as expected towards functional goals listed. [x] Progression is slowed due to complexities/Impairments listed. [] Progression has been slowed due to co-morbidities. [] Plan just implemented, too soon to assess goals progression <30days   [] Goals require adjustment due to lack of progress  [] Patient is not progressing as expected and requires additional follow up with physician  [] Other    Persisting Functional Limitations/Impairments:  []Sitting []Standing   []Walking []Squatting/bending    []Stairs []ADL's    []Transfers []Reaching  [x]Housework [x]Lifting  []Driving [x]Job related tasks  [x]Sports/Recreation : disc golf  [x]Sleeping  []Other:    ASSESSMENT:   See above, pt to start aquatic therapy this week. Treatment/Activity Tolerance:  [x] Patient able to complete tx  [] Patient limited by fatique  [x] Patient limited by pain  [] Patient limited by other medical complications  [] Other:      Prognosis: [x] Good [] Fair  [] Poor    Patient Requires Follow-up: [x] Yes  [] No    PLAN: See eval. PT 1-2x / week for 8 weeks. TOS, decrease upper trap recruitment, proper breathing, decrease neural tension. Pt insurance does not cover dry needling . 2/10 - ADD HIP STRENGTHENING , LUMBAR STABILITY , CORE STRENGTH .  If pt does not progress - aquatic therapy   [x] Continue per plan of care [] Alter current plan (see comments)  [] Plan of care initiated [] Hold pending MD visit [] Discharge    Electronically signed by: Delta Keane, PT       Note: If patient does not return for scheduled/ recommended follow up visits, this note will serve as a discharge from care along with most recent update on progress.

## 2021-03-10 ENCOUNTER — HOSPITAL ENCOUNTER (OUTPATIENT)
Dept: PHYSICAL THERAPY | Age: 46
Setting detail: THERAPIES SERIES
End: 2021-03-10
Payer: MEDICARE

## 2021-03-10 ENCOUNTER — HOSPITAL ENCOUNTER (OUTPATIENT)
Dept: PHYSICAL THERAPY | Age: 46
Setting detail: THERAPIES SERIES
Discharge: HOME | End: 2021-03-10
Payer: MEDICARE

## 2021-03-10 PROCEDURE — 97150 GROUP THERAPEUTIC PROCEDURES: CPT

## 2021-03-10 PROCEDURE — 97113 AQUATIC THERAPY/EXERCISES: CPT

## 2021-03-10 NOTE — FLOWSHEET NOTE
[address] [facility]  Phone: [phone]   Fax: [phone]          Physical Therapy Treatment Note/ Progress Report:     Date:  3/10/2021    Patient Name:  Milo Amador    :  1975  MRN: [id number]  Restrictions/Precautions:    Medical/Treatment Diagnosis Information:  Diagnosis: S16. 1XXA (ICD-10-CM) - Strain of neck muscle, initial encounter , K97.423A (ICD-10-CM) - Strain of left shoulder, initial encounter, M16.0 (ICD-10-CM) - Bilateral hip joint arthritis  Treatment Diagnosis: Impaired posture, increased muscle tension, decreased cervical ROM strength, BUE decreased strength, decrease hip/core strength, low back pain. Insurance/Certification information:  PT Insurance Information: [place]  Physician Information:  Referring Practitioner: Ruthy Barrios MD , MIGUELITO Delgadillo  Plan of care signed (Y/N): [x]  Yes []  No     Date of Patient follow up with Physician:      Progress Report: [x]  Yes  []  No     Date Range for reporting period:  Beginnin21  Endin21    Progress report due (10 Rx/or 30 days whichever is less):  4/3/21    Recertification due (POC duration/ or 90 days whichever is less):  5/3/21    Visit # Insurance Allowable Auth required? Date Range   18 30 []  Yes  [x]  No      Latex Allergy:  [x]NO      []YES  Preferred Language for Healthcare:   [x]English       []other:    Functional Scale:         Date assessed:  NDI: raw score = 27; dysfunction = 54%    21    Pain level:  4/10 neck , 3/10 low back , hips 4/10  - achy     SUBJECTIVE:    Pt reports he is coming from work and is alittle sore right now. Excited to start in the water.      OBJECTIVE:     3/8/21 - Lumbar ROM - 125 flexion, 40 ext, R SB 25 , L SB 30 , rotation ~5% limited bilaterally , B STRENGTH: hip flexion - 4-/5, hip abd 4-/5, hip ext 4/5 , knee ext 4+/5, knee flexion 4+/5    /10: Lumbar ROM - 120 flexion, 40 ext, SB 30 B , rotation ~10% limited bilaterally , B STRENGTH: hip flexion - 3+/5, hip abd 3+/5, hip ext 3+/5 , knee ext 4/5, knee flexion 4/5      1/25    Observation: upper trap over activation , rounded shoulders, FAIR posture     Test measurements:     1/25/21    EAST test - + for parathesias in B hands after 20 seconds.  Vascular refill in B hands - normal               CERV ROM L R L 1/25 R 1/25   Cervical Flexion 35   35   Cervical Extension 42   40   Cervical SB 25 27 25 25   Cervical rotation 50 55 45 45            ROM Left Right L 1/25 R 1/25   Shoulder Flex WNL WNL WNL WNL   Shoulder Abd WNL WNL WNL WNL   Shoulder ER C2 C2     Shoulder IR T8 L4                       Strength / Myotomes Left Right L 1/25 R 1/25   Cervical Flexion (C1-2)        Cervical Side-bending (C3)        Shoulder Shrug (C4)        Shoulder Flex 4 4 4 4   Shoulder Scap        Shoulder Abduction (C5) 4 4 4 4   Shoulder ER 4- 4- 4- 4-   Shoulder IR 4 4 4 4   Biceps (C6) 4 4 4 4   Triceps (C7) 4 4 4 4   Wrist Extension (C6)        Wrist Flexion (C7)                 Thumb Abduction (C8)        Finger Abduction (T1)                   RESTRICTIONS/PRECAUTIONS:     Exercises/Interventions:   Therapeutic Exercise (02141) or [id number] Resistance / level Sets/sec Reps Notes   UBE 1.0   Painful - stopped    pulleys        Chin tucks      Upper trap stretch      Post shoulder stretch       Doorway pec stretch    Hands high x3  Hands low x3    Standing thread the needle       S/L open book      TB:   Mid row  Ext   tricep ext  LPD   LIME      No money ER LIME 20    Wall push up with serratus +  10    Chin tuck with BUE flexion to 90       Thoracic ext sitting at chair    Towel at T6   Sitting cervical AROM SB    Pt Hands stabilizing C5-7   Supine flexion AROM      Supine Foam roll chest openers stretch - hands low  UE at 90 90   ''     Supine scalene stretch        Bridge with ADD squeeze  5''x20     SLR flexion  abd  x10 B  x10 B     IB S  2x30''     HSS at EOT  2x30''     Hip flexor stretch at [email or web address]   Date: 01/14/2021   Prepared by: Addison Beard     Exercises   Seated Levator Scapulae Stretch - 10 reps - 3 sets - 1x daily - 7x weekly   Seated Cervical Sidebending Stretch - 10 reps - 3 sets - 1x daily - 7x weekly   Doorway Pec Stretch at 90 Degrees Abduction - 10 reps - 3 sets - 1x daily - 7x weekly   Standing Shoulder Posterior Capsule Stretch - 10 reps - 3 sets - 1x daily - 7x weekly       Access Code: [email or web address]   URL: [email or web address]   Date: 01/18/2021   Prepared by: Addison Beard     Exercises   Sidelying Thoracic Rotation - 10 reps - 3 sets - 1x daily - 7x weekly   Median Nerve Flossing - Tray - 10 reps - 3 sets - 1x daily - 7x weekly   Standing Radial Nerve Glide - 10 reps - 3 sets - 1x daily - 7x weekly   Ulnar Nerve Flossing - 10 reps - 3 sets - 1x daily - 7x weekly     Access Code: [email or web address]   URL: [email or web address]   Date: 01/21/2021   Prepared by: Addison Beard     Exercises   Supine Diaphragmatic Breathing - 10 reps - 3 sets - 1x daily - 7x weekly   Seated Thoracic Self-Mobilization - 10 reps - 3 sets - 1x daily - 7x weekly     Access Code: [email or web address]   URL: [email or web address]   Date: 02/10/2021   Prepared by: Addison Pulidoaway     Exercises   Supine Bridge - 10 reps - 3 sets - 1x daily - 7x weekly   Supine Active Straight Leg Raise - 10 reps - 3 sets - 1x daily - 7x weekly   Sidelying Hip Abduction - 10 reps - 3 sets - 1x daily - 7x weekly   Seated Hamstring Stretch - 10 reps - 3 sets - 1x daily - 7x weekly     Access Code: [email or web address]   URL: [email or web address]   Date: 02/18/2021   Prepared by: Addison Beard     Exercises   Standing Hip Flexor Stretch - 10 reps - 3 sets - 1x daily - 7x weekly   Seated Piriformis Stretch with Trunk Bend - 10 reps - 3 sets - 1x daily - 7x weekly   Standing Gastroc Stretch - 10 reps - 3 sets - 1x daily - 7x weekly           Therapeutic Exercise and NMR EXR  [x] (73259) Provided verbal/tactile cueing for activities related to strengthening, flexibility, endurance, ROM  for improvements in cervical, postural, scapular, scapulothoracic and UE control with self care, reaching, carrying, lifting, house/yardwork, driving/computer work.    [] (32119) Provided verbal/tactile cueing for activities related to improving balance, coordination, kinesthetic sense, posture, motor skill, proprioception  to assist with cervical, scapular, scapulothoracic and UE control with self care, reaching, carrying, lifting, house/yardwork, driving/computer work.  [] (56570) Therapist is in constant attendance of 2 or more patients providing skilled therapy interventions, but not providing any significant amount of measurable one-on-one time to either patient, for improvements in cervical, scapular, scapulothoracic and UE control with self care, reaching, carrying, lifting, house/yardwork, driving, computer work. Therapeutic Activities:    [x] (18502 or 39027) Provided verbal/tactile cueing for activities related to improving balance, coordination, kinesthetic sense, posture, motor skill, proprioception and motor activation to allow for proper function of cervical, scapular, scapulothoracic and UE control with self care, carrying, lifting, driving/computer work.      Home Exercise Program:    [x] (38905) Reviewed/Progressed HEP activities related to strengthening, flexibility, endurance, ROM of cervical, scapular, scapulothoracic and UE control with self care, reaching, carrying, lifting, house/yardwork, driving/computer work  [] (12270) Reviewed/Progressed HEP activities related to improving balance, coordination, kinesthetic sense, posture, motor skill, proprioception of cervical, scapular, scapulothoracic and UE control with self care, reaching, carrying, lifting, house/yardwork, driving/computer work      Manual Treatments:  PROM / STM / Oscillations-Mobs:  G-I, II, III, IV (PA's, Inf., Post.)  [] (41615) Provided manual therapy to mobilize soft tissue/joints of cervical/CT, scapular GHJ and UE for the purpose of decreasing headache, modulating pain, promoting relaxation,  increasing ROM, reducing/eliminating soft tissue swelling/inflammation/restriction, improving soft tissue extensibility and allowing for proper ROM for normal function with self care, reaching, carrying, lifting, house/yardwork, driving/computer work    Modalities:  I    Charges:  Timed Code Treatment Minutes: 45   Total Treatment Minutes: 60       [] EVAL - LOW (20091)   [] EVAL - MOD (20136)  [] EVAL - HIGH (44348)  [] RE-EVAL (54334)  [] RJ(16722) x 1      [] NMR (72523) x 1   [] Ionto  [] Manual (63681) x 1       [] Ultrasound  [] TA x 2    [] Mech Traction (72098)  [] Home Management Training x   [] ES (un) (05086):           [x] AquaticX3    [] ES(attended) (61146)   [x] Javed Cain    [] Other:    GOALS:  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 Independent in HEP and progression per patient tolerance, in order to prevent re-injury. [x] Progressing: [] Met: [] Not Met: [] Adjusted  2. Patient will have a decrease in pain to facilitate improvement in movement, function, and ADLs as indicated by Functional Deficits. [x] Progressing: [] Met: [] Not Met: [] Adjusted    Long Term Goals: To be achieved in: 6 weeks  1. Disability index score of 15% or less for the NDI to assist with reaching prior level of function. [x] Progressing: [] Met: [] Not Met: [] Adjusted  2. Patient will demonstrate increased AROM to [facility] of cervical/thoracic spine to allow for proper joint functioning as indicated by patients Functional Deficits. [x] Progressing: [] Met: [] Not Met: [] Adjusted  3.  Patient will demonstrate an increase in postural awareness and control and activation of  Deep cervical stabilizers to allow for proper functional mobility as indicated by patients Functional Deficits. [x] Progressing: [] Met: [] Not Met: [] Adjusted  4. Patient will return to functional activities including work, lifting, without increased symptoms or restriction. [x] Progressing: [] Met: [] Not Met: [] Adjusted   5. Patient will improve B hip strength to 4+/5 in order to decrease pain and improve function. [x] Progressing: [] Met: [] Not Met: [] Adjusted  6. Patient will improve core strength and mobility for improved function by scoring 20% or less on JUAN PABLO. [x] Progressing: [] Met: [] Not Met: [] Adjusted    Overall Progression Towards Functional goals/ Treatment Progress Update:  [] Patient is progressing as expected towards functional goals listed. [x] Progression is slowed due to complexities/Impairments listed. [] Progression has been slowed due to co-morbidities. [] Plan just implemented, too soon to assess goals progression <30days   [] Goals require adjustment due to lack of progress  [] Patient is not progressing as expected and requires additional follow up with physician  [] Other    Persisting Functional Limitations/Impairments:  []Sitting []Standing   []Walking []Squatting/bending    []Stairs []ADL's    []Transfers []Reaching  [x]Housework [x]Lifting  []Driving [x]Job related tasks  [x]Sports/Recreation : disc golf  [x]Sleeping  []Other:    ASSESSMENT:   Pt with good first pool session today, no increases in pain and able to tolerate session very well. Continue to progress as tolerated. Treatment/Activity Tolerance:  [x] Patient able to complete tx  [] Patient limited by fatique  [x] Patient limited by pain  [] Patient limited by other medical complications  [] Other:      Prognosis: [x] Good [] Fair  [] Poor    Patient Requires Follow-up: [x] Yes  [] No    PLAN: See eval. PT 1-2x / week for 8 weeks. TOS, decrease upper trap recruitment, proper breathing, decrease neural tension. Pt insurance does not cover dry needling .  2/10 - ADD HIP STRENGTHENING , LUMBAR STABILITY , CORE STRENGTH . If pt does not progress - aquatic therapy   [x] Continue per plan of care [] Alter current plan (see comments)  [] Plan of care initiated [] Hold pending MD visit [] Discharge    Electronically signed by: Halina Nichols, PT       Note: If patient does not return for scheduled/ recommended follow up visits, this note will serve as a discharge from care along with most recent update on progress.

## 2021-03-15 ENCOUNTER — HOSPITAL ENCOUNTER (OUTPATIENT)
Dept: PHYSICAL THERAPY | Age: 46
Setting detail: THERAPIES SERIES
Discharge: HOME | End: 2021-03-15
Payer: MEDICARE

## 2021-03-15 PROCEDURE — 97530 THERAPEUTIC ACTIVITIES: CPT

## 2021-03-15 PROCEDURE — 97110 THERAPEUTIC EXERCISES: CPT

## 2021-03-17 ENCOUNTER — HOSPITAL ENCOUNTER (OUTPATIENT)
Dept: PHYSICAL THERAPY | Age: 46
Setting detail: THERAPIES SERIES
Discharge: HOME | End: 2021-03-17
Payer: MEDICARE

## 2021-03-17 PROCEDURE — 97113 AQUATIC THERAPY/EXERCISES: CPT

## 2021-03-17 PROCEDURE — 97150 GROUP THERAPEUTIC PROCEDURES: CPT

## 2021-03-17 NOTE — FLOWSHEET NOTE
[address] [facility] Physical Therapy  Phone: [phone]   Fax: [phone]          Physical Therapy Treatment Note/ Progress Report:     Date:  3/17/2021    Patient Name:  Nazia Tran    :  1975  MRN: [id number]  Restrictions/Precautions:    Medical/Treatment Diagnosis Information:  Diagnosis: S16. 1XXA (ICD-10-CM) - Strain of neck muscle, initial encounter , G05.015A (ICD-10-CM) - Strain of left shoulder, initial encounter, M16.0 (ICD-10-CM) - Bilateral hip joint arthritis  Treatment Diagnosis: Impaired posture, increased muscle tension, decreased cervical ROM strength, BUE decreased strength, decrease hip/core strength, low back pain. Insurance/Certification information:  PT Insurance Information: [place]  Physician Information:  Referring Practitioner: Anuja Hutchins MD , MIGUELITO Boggs  Plan of care signed (Y/N): [x]  Yes []  No      Date of Patient follow up with Physician:      Progress Report: [x]  Yes  []  No     Date Range for reporting period:  Beginnin21  Endin21    Progress report due (10 Rx/or 30 days whichever is less):  3/6/35    Recertification due (POC duration/ or 90 days whichever is less):  5/3/21    Visit # Insurance Allowable Auth required? Date Range    []  Yes  [x]  No      Latex Allergy:  [x]NO      []YES  Preferred Language for Healthcare:   [x]English       []other:    Functional Scale:         Date assessed:  NDI: raw score = 27; dysfunction = 54%    21    Pain level:  3/10 neck , 3/10 low back , hips 4/10  - achy     SUBJECTIVE:    Pt reports pain in his shoulders has been waking him up sleeping. Work not too bad today, pain is mild to moderate at the moment.      OBJECTIVE:     3/8/21 - Lumbar ROM - 125 flexion, 40 ext, R SB 25 , L SB 30 , rotation ~5% limited bilaterally , B STRENGTH: hip flexion - 4-/5, hip abd 4-/5, hip ext 4/5 , knee ext 4+/5, knee flexion 4+/5    2/10: Lumbar ROM - 120 flexion, 40 ext, SB 30 B , rotation ~10% limited bilaterally , B STRENGTH: hip flexion - 3+/5, hip abd 3+/5, hip ext 3+/5 , knee ext 4/5, knee flexion 4/5      1/25    Observation: upper trap over activation , rounded shoulders, FAIR posture     Test measurements:     1/25/21    EAST test - + for parathesias in B hands after 20 seconds.  Vascular refill in B hands - normal               CERV ROM L R L 1/25 R 1/25   Cervical Flexion 35   35   Cervical Extension 42   40   Cervical SB 25 27 25 25   Cervical rotation 50 55 45 45            ROM Left Right L 1/25 R 1/25   Shoulder Flex WNL WNL WNL WNL   Shoulder Abd WNL WNL WNL WNL   Shoulder ER C2 C2     Shoulder IR T8 L4                       Strength / Myotomes Left Right L 1/25 R 1/25   Cervical Flexion (C1-2)        Cervical Side-bending (C3)        Shoulder Shrug (C4)        Shoulder Flex 4 4 4 4   Shoulder Scap        Shoulder Abduction (C5) 4 4 4 4   Shoulder ER 4- 4- 4- 4-   Shoulder IR 4 4 4 4   Biceps (C6) 4 4 4 4   Triceps (C7) 4 4 4 4   Wrist Extension (C6)        Wrist Flexion (C7)                 Thumb Abduction (C8)        Finger Abduction (T1)                   RESTRICTIONS/PRECAUTIONS:     Exercises/Interventions:   Therapeutic Exercise (12544) or [id number] Resistance / level Sets/sec Reps Notes   UBE 1.0   Painful - stopped    pulleys        Chin tucks      Upper trap stretch      Post shoulder stretch       Doorway pec stretch    Hands high x3  Hands low x3    Standing thread the needle       S/L open book      TB:   Mid row  Ext   tricep ext  LPD   LIME      No money ER LIME 20    Wall push up with serratus +  10    Chin tuck with BUE flexion to 90       Thoracic ext sitting at chair    Towel at T6   Sitting cervical AROM SB    Pt Hands stabilizing C5-7   Supine flexion AROM      Supine Foam roll chest openers stretch - hands low  UE at 90 90   ''     Supine scalene stretch        Bridge with ADD squeeze  5''x20     SLR flexion  abd  x10 B  x10 B     IB S  2x30''     HSS at EOT  2x30''     Hip flexor stretch at stairs  2x30''     Piriformis stretch   2x30''     TG squats  3x10     SL clamshell LIME 2x10 B     SB LTR  2x10     SB DKC  5''x20     Standing SB EXT, SB    With TrA hold   Leg press DL 90# 2 10    Wall push up with SB  2 10                                Therapeutic Activities (66857)       diaphramic breathing in supine     Min cues to perform 1/25    progress note/reassessment , objective measures  x30 min   3/8   Re-assessment for LBP/HIPS    2/10          TRX       Lateral band walks LIME 10' 6    Monster walks                                                        NMR re-education (64690)       Prone chin tucks       Prone scap retraction                            Median Nerve flossing   Ulnar nerve flossing               TrA bracing in supine  10''x10     TrA with PPT  10''x10     Supine head lifts   Side lying head lifts  10''x10  10''x10 B     Pelvic tilts on SB with TrA actvation A/P, M/L, CW/CWW  x30     LTR with SB and TrA activation   X20     multifidi rows 2 plates 9C79 B     Mid row  4 plates [id number]      LPD 4 plates 9T26                                               Manual Intervention (23110)       STM to upper trap, SOR, gentle distraction to cervical spine, pain free PROM                                              AquaticTherapy Dates of Service: 3/10, 3/17  Aquatic Visits Exercises/Activities: BLE   Transfers:          % Immersion:            Ambulation:   UE Exercises:       Forwards   x1 Shoulder Shrugs      Lateral   x1 Shoulder Circles  xx    Retro   x1 Scapular Retraction  x15 CP    Marching   Push Downs       [place]   Punching     Jog    Rowing     Multifidi walkouts with paddle   Elbow Flex/Ext x15 cp      Shldr Flex/Ext x15 cp      Shldr aBd/aDd x15 CP   LE Exercises:  Shldr Horiz aBd/aDd x15CP   HR/TR x15 Shldr IR/ER    Marches xx Arm Circles xx   Squats X15 PNF Diagonals    Hamstring Curls X15 Wall Push Ups xx   Hip Flexion (SLR) x15 Figure 8's Hip aBduction (SLR) x15 Bilateral Pull Downs    Hip Extension (SLR) x15      Hip aDduction (SLR)      Hip Circles x15 Functional:    Hip IR/Er  Step up forward x10    TrA Set  10''x10 Step up lateral  xx   Pelvic Tilts  x10 a/p, M/L, CW/CWW Step down     Fig 8's   Lunges Forward    LE PNF  Lunges Retro      Lunges Lateral     Balance:   Lower ab curl with noodle     SLS        Tandem Stance       NBOS eyes open  Seated:     NBOS eyes closed  Ankle pumps     Hand to Opposite Knee  Ankle Circles     Fwd Step ups to SLS  Knee Flex/Ext    Lateral Step ups to SLS  Hip aBd/aDd    Stop/Go Gait   Bicycle       Ankle DF/PF      Ankle Inv/Ev    Stretching:       Gastroc/Soleus 2X30''     Hamstring  2X30'' Noodle:     Knee Flex Stretch  Leg Press    Piriformis  xx Noodle Hang at [place]    Hip Flexor xx Noodle Hang Deep Water    SKTC  Noodle Bicycle     DKTC       ITB      Quad xx Deep Water:    Mid Back  xx Jog    UT  Jumping Jacks    Post Shoulder  Heel to Toe    Ladder Pull  Hand to Opposite Knee    Pec Stretch xx Rocking Horse      [employer] Skier          Cervical:   Other:     AROM Flexion      AROM Extension      AROM Side Bending      AROM Rotation      Chin Tucks      Chin Nods        Aquatic Abbreviation Key  B= Belt DB= Dumbells T= Theratube   H= Hydrotone N= Noodles W= Weights   P= Paddles S= Speedo equipment K= Kickboard         Patient education:  -pt educated on diagnosis, prognosis and expectations for rehab  -all pt questions were answered    Home Exercise Program:  Access Code: [email or web address]   URL: [email or web address]   Date: 01/04/2021   Prepared by: Susan Kerns     Exercises   Seated Scapular Retraction - 10 reps - 3 sets - 1x daily - 7x weekly   Seated Cervical Retraction - 10 reps - 3 sets - 1x daily - 7x weekly   Standing Isometric Cervical Flexion with Manual Resistance - 10 reps - 3 sets - 1x daily - 7x weekly   Standing Isometric Cervical Extension with Manual Resistance - 10 reps - 3 sets - 1x daily - 7x weekly   Seated Isometric Cervical Sidebending - 10 reps - 3 sets - 1x daily - 7x weekly     Access Code: [email or web address]   URL: [email or web address]   Date: 01/14/2021   Prepared by: Mecarune Clapper     Exercises   Seated Levator Scapulae Stretch - 10 reps - 3 sets - 1x daily - 7x weekly   Seated Cervical Sidebending Stretch - 10 reps - 3 sets - 1x daily - 7x weekly   Doorway Pec Stretch at 90 Degrees Abduction - 10 reps - 3 sets - 1x daily - 7x weekly   Standing Shoulder Posterior Capsule Stretch - 10 reps - 3 sets - 1x daily - 7x weekly       Access Code: [email or web address]   URL: [email or web address]   Date: 01/18/2021   Prepared by: Mechele Clapper     Exercises   Sidelying Thoracic Rotation - 10 reps - 3 sets - 1x daily - 7x weekly   Median Nerve Flossing - Tray - 10 reps - 3 sets - 1x daily - 7x weekly   Standing Radial Nerve Glide - 10 reps - 3 sets - 1x daily - 7x weekly   Ulnar Nerve Flossing - 10 reps - 3 sets - 1x daily - 7x weekly     Access Code: [email or web address]   URL: [email or web address]   Date: 01/21/2021   Prepared by: Mechele Clapper     Exercises   Supine Diaphragmatic Breathing - 10 reps - 3 sets - 1x daily - 7x weekly   Seated Thoracic Self-Mobilization - 10 reps - 3 sets - 1x daily - 7x weekly     Access Code: [email or web address]   URL: [email or web address]   Date: 02/10/2021   Prepared by: Mechele Clapper     Exercises   Supine Bridge - 10 reps - 3 sets - 1x daily - 7x weekly   Supine Active Straight Leg Raise - 10 reps - 3 sets - 1x daily - 7x weekly   Sidelying Hip Abduction - 10 reps - 3 sets - 1x daily - 7x weekly   Seated Hamstring Stretch - 10 reps - 3 sets - 1x daily - 7x weekly     Access Code: [email or web address]   URL: [email or web address]/   Date: 02/18/2021   Prepared by: Mechele Clapper     Exercises   Standing Hip Flexor Stretch - 10 reps - 3 sets - 1x daily - 7x weekly   Seated Piriformis Stretch with Trunk Bend - 10 reps - 3 sets - 1x daily - 7x weekly   Standing Gastroc Stretch - 10 reps - 3 sets PROM / STM / Oscillations-Mobs:  G-I, II, III, IV (PA's, Inf., Post.)  [] (30702) Provided manual therapy to mobilize soft tissue/joints of cervical/CT, scapular GHJ and UE for the purpose of decreasing headache, modulating pain, promoting relaxation,  increasing ROM, reducing/eliminating soft tissue swelling/inflammation/restriction, improving soft tissue extensibility and allowing for proper ROM for normal function with self care, reaching, carrying, lifting, house/yardwork, driving/computer work    Modalities:  I    Charges:  Timed Code Treatment Minutes: 25   Total Treatment Minutes: 58       [] EVAL - LOW (15280)   [] EVAL - MOD (40574)  [] EVAL - HIGH (76379)  [] RE-EVAL (24026)  [] SO(16193) x 1      [] NMR (14389) x 1   [] Ionto  [] Manual (02225) x 1       [] Ultrasound  [] TA x 2    [] Mech Traction (43584)  [] Home Management Training x   [] ES (un) (52532):           [x] AquaticX2    [] ES(attended) (32289)   [x] Oneta Feast    [] Other:    GOALS:  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 Independent in HEP and progression per patient tolerance, in order to prevent re-injury. [x] Progressing: [] Met: [] Not Met: [] Adjusted  2. Patient will have a decrease in pain to facilitate improvement in movement, function, and ADLs as indicated by Functional Deficits. [x] Progressing: [] Met: [] Not Met: [] Adjusted    Long Term Goals: To be achieved in: 6 weeks  1. Disability index score of 15% or less for the NDI to assist with reaching prior level of function. [x] Progressing: [] Met: [] Not Met: [] Adjusted  2. Patient will demonstrate increased AROM to [facility] of cervical/thoracic spine to allow for proper joint functioning as indicated by patients Functional Deficits. [x] Progressing: [] Met: [] Not Met: [] Adjusted  3.  Patient will demonstrate an increase in postural awareness and control and activation of  Deep cervical stabilizers to allow for proper functional mobility as indicated by patients Functional Deficits. [x] Progressing: [] Met: [] Not Met: [] Adjusted  4. Patient will return to functional activities including work, lifting, without increased symptoms or restriction. [x] Progressing: [] Met: [] Not Met: [] Adjusted   5. Patient will improve B hip strength to 4+/5 in order to decrease pain and improve function. [x] Progressing: [] Met: [] Not Met: [] Adjusted  6. Patient will improve core strength and mobility for improved function by scoring 20% or less on JUAN PABLO. [x] Progressing: [] Met: [] Not Met: [] Adjusted    Overall Progression Towards Functional goals/ Treatment Progress Update:  [] Patient is progressing as expected towards functional goals listed. [x] Progression is slowed due to complexities/Impairments listed. [] Progression has been slowed due to co-morbidities. [] Plan just implemented, too soon to assess goals progression <30days   [] Goals require adjustment due to lack of progress  [] Patient is not progressing as expected and requires additional follow up with physician  [] Other    Persisting Functional Limitations/Impairments:  []Sitting []Standing   []Walking []Squatting/bending    []Stairs []ADL's    []Transfers []Reaching  [x]Housework [x]Lifting  []Driving [x]Job related tasks  [x]Sports/Recreation : disc golf  [x]Sleeping  []Other:    ASSESSMENT:   Pt with good visit today again , pain is starting to get under control and tolerable with activity now. Continue pool/ on land visits and progress as tolerated for LTG achievement.      Treatment/Activity Tolerance:  [x] Patient able to complete tx  [] Patient limited by fatique  [x] Patient limited by pain  [] Patient limited by other medical complications  [] Other:      Prognosis: [x] Good [] Fair  [] Poor    Patient Requires Follow-up: [x] Yes  [] No    PLAN: See eval. PT 1-2x / week for 8 weeks. TOS, decrease upper trap recruitment, proper breathing, decrease neural tension. Pt insurance does not cover dry needling . 2/10 - ADD HIP STRENGTHENING , LUMBAR STABILITY , CORE STRENGTH . If pt does not progress - aquatic therapy   [x] Continue per plan of care [] Alter current plan (see comments)  [] Plan of care initiated [] Hold pending MD visit [] Discharge    Electronically signed by: Tarsha Garland PT       Note: If patient does not return for scheduled/ recommended follow up visits, this note will serve as a discharge from care along with most recent update on progress.

## 2021-03-22 ENCOUNTER — HOSPITAL ENCOUNTER (OUTPATIENT)
Dept: PHYSICAL THERAPY | Age: 46
Setting detail: THERAPIES SERIES
Discharge: HOME | End: 2021-03-22
Payer: MEDICARE

## 2021-03-22 PROCEDURE — 97530 THERAPEUTIC ACTIVITIES: CPT

## 2021-03-22 PROCEDURE — 97110 THERAPEUTIC EXERCISES: CPT

## 2021-03-22 NOTE — FLOWSHEET NOTE
[address] [facility]  Phone: [phone]   Fax: [phone]          Physical Therapy Treatment Note/ Progress Report:     Date:  3/22/2021    Patient Name:  Edilia Hester    :  1975  MRN: [id number]  Restrictions/Precautions:    Medical/Treatment Diagnosis Information:  Diagnosis: S16. 1XXA (ICD-10-CM) - Strain of neck muscle, initial encounter , K42.604H (ICD-10-CM) - Strain of left shoulder, initial encounter, M16.0 (ICD-10-CM) - Bilateral hip joint arthritis  Treatment Diagnosis: Impaired posture, increased muscle tension, decreased cervical ROM strength, BUE decreased strength, decrease hip/core strength, low back pain. Insurance/Certification information:  PT Insurance Information: [place]  Physician Information:  Referring Practitioner: Marius Spatz, MD , MIGUELITO Glass  Plan of care signed (Y/N): [x]  Yes []  No      Date of Patient follow up with Physician:      Progress Report: [x]  Yes  []  No     Date Range for reporting period:  Beginnin21  Endin21    Progress report due (10 Rx/or 30 days whichever is less):  97    Recertification due (POC duration/ or 90 days whichever is less):  5/3/21    Visit # Insurance Allowable Auth required? Date Range    []  Yes  [x]  No      Latex Allergy:  [x]NO      []YES  Preferred Language for Healthcare:   [x]English       []other:    Functional Scale:         Date assessed:  NDI: raw score = 27; dysfunction = 54%    21    Pain level:  3/10 neck , 3/10 low back , hips 4/10  - achy     SUBJECTIVE:  Pt states he is feeling good, played disc golf yesterday with no increase in pain and cleaned his moms house. Did wake up this morning with soreness but nothing like it used to be. He is looking into getting a new mattress.       OBJECTIVE:     3/8/21 - Lumbar ROM - 125 flexion, 40 ext, R SB 25 , L SB 30 , rotation ~5% limited bilaterally , B STRENGTH: hip flexion - 4-/5, hip abd 4-/5, hip ext 4/5 , knee ext 4+/5, knee flexion 4+/5    2/10: Lumbar ROM - 120 flexion, 40 ext, SB 30 B , rotation ~10% limited bilaterally , B STRENGTH: hip flexion - 3+/5, hip abd 3+/5, hip ext 3+/5 , knee ext 4/5, knee flexion 4/5      1/25    Observation: upper trap over activation , rounded shoulders, FAIR posture     Test measurements:     1/25/21    EAST test - + for parathesias in B hands after 20 seconds.  Vascular refill in B hands - normal               CERV ROM L R L 1/25 R 1/25   Cervical Flexion 35   35   Cervical Extension 42   40   Cervical SB 25 27 25 25   Cervical rotation 50 55 45 45            ROM Left Right L 1/25 R 1/25   Shoulder Flex WNL WNL WNL WNL   Shoulder Abd WNL WNL WNL WNL   Shoulder ER C2 C2     Shoulder IR T8 L4                       Strength / Myotomes Left Right L 1/25 R 1/25   Cervical Flexion (C1-2)        Cervical Side-bending (C3)        Shoulder Shrug (C4)        Shoulder Flex 4 4 4 4   Shoulder Scap        Shoulder Abduction (C5) 4 4 4 4   Shoulder ER 4- 4- 4- 4-   Shoulder IR 4 4 4 4   Biceps (C6) 4 4 4 4   Triceps (C7) 4 4 4 4   Wrist Extension (C6)        Wrist Flexion (C7)                 Thumb Abduction (C8)        Finger Abduction (T1)                   RESTRICTIONS/PRECAUTIONS:     Exercises/Interventions:   Therapeutic Exercise (61660) or [id number] Resistance / level Sets/sec Reps Notes   Bike   x3 min      UBE 1.0   Painful - stopped    pulleys        Chin tucks      Upper trap stretch      Post shoulder stretch       Doorway pec stretch    Hands high x3  Hands low x3    Standing thread the needle       S/L open book      TB:   Mid row  Ext   tricep ext  LPD   LIME      No money ER LIME 20    Wall push up with serratus +  10    Chin tuck with BUE flexion to 90       Thoracic ext sitting at chair    Towel at T6   Sitting cervical AROM SB    Pt Hands stabilizing C5-7   Supine flexion AROM      Supine Foam roll chest openers stretch - hands low  UE at 90 90   ''     Supine scalene stretch        Bridge with ADD squeeze  5''x20     SLR flexion  abd  x10 B  x10 B     IB S  2x30''     HSS at EOT  2x30''     Hip flexor stretch at stairs  2x30''     Piriformis stretch   2x30''     TG squats  3x10     SL clamshell LIME 2x10 B     SB LTR  2x10     SB DKC  5''x20     Standing SB EXT, SB    With TrA hold   Leg press DL 90# 2 10    Wall push up with SB  2 10    Wall sit  3 20''    Prone quad stretch   3 20''                                        Therapeutic Activities (11703)       diaphramic breathing in supine     Min cues to perform 1/25    progress note/reassessment , objective measures  x30 min   3/8   Re-assessment for LBP/HIPS    2/10          TRX squats   1 15    Lateral band walks LIME 10' 6    Monster walks fwd and backward Lime 10 6                                                     NMR re-education (67679)       Prone chin tucks       Prone scap retraction                            Median Nerve flossing   Ulnar nerve flossing               TrA bracing in supine  10''x10     TrA with PPT  10''x10     Supine head lifts   Side lying head lifts  10''x10  10''x10 B     Pelvic tilts on SB with TrA actvation A/P, M/L, CW/CWW  x30     LTR with SB and TrA activation   X20     multifidi rows 2 plates 7N96 B     Mid row  4 plates 1P54      LPD 4 plates 9Y98                                               Manual Intervention (51176)       STM to upper trap, SOR, gentle distraction to cervical spine, pain free PROM                                              AquaticTherapy Dates of Service: 3/10, 3/17  Aquatic Visits Exercises/Activities: BLE   Transfers:          % Immersion:            Ambulation:   UE Exercises:       Forwards   x1 Shoulder Shrugs      Lateral   x1 Shoulder Circles  xx    Retro   x1 Scapular Retraction  x15 CP    Marching   Push Downs       [place]   Punching     Jog    Rowing     Multifidi walkouts with paddle   Elbow Flex/Ext x15 cp      Shldr Flex/Ext x15 cp      Shldr aBd/aDd x15 CP   LE Exercises:  Shldr Horiz aBd/aDd x15CP   HR/TR x15 Shldr IR/ER    Marches xx Arm Circles xx   Squats X15 PNF Diagonals    Hamstring Curls X15 Wall Push Ups xx   Hip Flexion (SLR) x15 Figure 8's    Hip aBduction (SLR) x15 Bilateral Pull Downs    Hip Extension (SLR) x15      Hip aDduction (SLR)      Hip Circles x15 Functional:    Hip IR/Er  Step up forward x10    TrA Set  10''x10 Step up lateral  xx   Pelvic Tilts  x10 a/p, M/L, CW/CWW Step down     Fig 8's   Lunges Forward    LE PNF  Lunges Retro      Lunges Lateral     Balance:   Lower ab curl with noodle     SLS        Tandem Stance       NBOS eyes open  Seated:     NBOS eyes closed  Ankle pumps     Hand to Opposite Knee  Ankle Circles     Fwd Step ups to SLS  Knee Flex/Ext    Lateral Step ups to SLS  Hip aBd/aDd    Stop/Go Gait   Bicycle       Ankle DF/PF      Ankle Inv/Ev    Stretching:       Gastroc/Soleus 2X30''     Hamstring  2X30'' Noodle:     Knee Flex Stretch  Leg Press    Piriformis  xx Noodle Hang at [place]    Hip Flexor xx Noodle Hang Deep Water    SKTC  Noodle Bicycle     DKTC       ITB      Quad xx Deep Water:    Mid Back  xx Jog    UT  Jumping Jacks    Post Shoulder  Heel to Toe    Ladder Pull  Hand to Opposite Knee    Pec Stretch xx Rocking Horse      [employer] Skier          Cervical:   Other:     AROM Flexion      AROM Extension      AROM Side Bending      AROM Rotation      Chin Tucks      Chin Nods        Aquatic Abbreviation Key  B= Belt DB= Dumbells T= Theratube   H= Hydrotone N= Noodles W= Weights   P= Paddles S= Speedo equipment K= Kickboard         Patient education:  -pt educated on diagnosis, prognosis and expectations for rehab  -all pt questions were answered    Home Exercise Program:  Access Code: [email or web address]   URL: [email or web address]   Date: 01/04/2021   Prepared by: Marek Gross     Exercises   Seated Scapular Retraction - 10 reps - 3 sets - 1x daily - 7x weekly   Seated Cervical Retraction - 10 reps - 3 sets - 1x daily - 7x weekly   Standing Isometric Cervical Flexion with Manual Resistance - 10 reps - 3 sets - 1x daily - 7x weekly   Standing Isometric Cervical Extension with Manual Resistance - 10 reps - 3 sets - 1x daily - 7x weekly   Seated Isometric Cervical Sidebending - 10 reps - 3 sets - 1x daily - 7x weekly     Access Code: [email or web address]   URL: "[employer]/   Date: 01/14/2021   Prepared by: Lucien Counts     Exercises   Seated Levator Scapulae Stretch - 10 reps - 3 sets - 1x daily - 7x weekly   Seated Cervical Sidebending Stretch - 10 reps - 3 sets - 1x daily - 7x weekly   Doorway Pec Stretch at 90 Degrees Abduction - 10 reps - 3 sets - 1x daily - 7x weekly   Standing Shoulder Posterior Capsule Stretch - 10 reps - 3 sets - 1x daily - 7x weekly       Access Code: [email or web address]   URL: "[employer]/   Date: 01/18/2021   Prepared by: Lucien Counts     Exercises   Sidelying Thoracic Rotation - 10 reps - 3 sets - 1x daily - 7x weekly   Median Nerve Flossing - Tray - 10 reps - 3 sets - 1x daily - 7x weekly   Standing Radial Nerve Glide - 10 reps - 3 sets - 1x daily - 7x weekly   Ulnar Nerve Flossing - 10 reps - 3 sets - 1x daily - 7x weekly     Access Code: [email or web address]   URL: [email or web address]   Date: 01/21/2021   Prepared by: Lucien Counts     Exercises   Supine Diaphragmatic Breathing - 10 reps - 3 sets - 1x daily - 7x weekly   Seated Thoracic Self-Mobilization - 10 reps - 3 sets - 1x daily - 7x weekly     Access Code: [email or web address]   URL: "[employer]/   Date: 02/10/2021   Prepared by: Lucien Counts     Exercises   Supine Bridge - 10 reps - 3 sets - 1x daily - 7x weekly   Supine Active Straight Leg Raise - 10 reps - 3 sets - 1x daily - 7x weekly   Sidelying Hip Abduction - 10 reps - 3 sets - 1x daily - 7x weekly   Seated Hamstring Stretch - 10 reps - 3 sets - 1x daily - 7x weekly     Access Code: [email or web address]   URL: "[employer]/   Date: 02/18/2021   Prepared by: Lucien Holt Exercises   Standing Hip Flexor Stretch - 10 reps - 3 sets - 1x daily - 7x weekly   Seated Piriformis Stretch with Trunk Bend - 10 reps - 3 sets - 1x daily - 7x weekly   Standing Gastroc Stretch - 10 reps - 3 sets - 1x daily - 7x weekly           Therapeutic Exercise and NMR EXR  [x] (05496) Provided verbal/tactile cueing for activities related to strengthening, flexibility, endurance, ROM  for improvements in cervical, postural, scapular, scapulothoracic and UE control with self care, reaching, carrying, lifting, house/yardwork, driving/computer work.    [] (38889) Provided verbal/tactile cueing for activities related to improving balance, coordination, kinesthetic sense, posture, motor skill, proprioception  to assist with cervical, scapular, scapulothoracic and UE control with self care, reaching, carrying, lifting, house/yardwork, driving/computer work.  [] (81383) Therapist is in constant attendance of 2 or more patients providing skilled therapy interventions, but not providing any significant amount of measurable one-on-one time to either patient, for improvements in cervical, scapular, scapulothoracic and UE control with self care, reaching, carrying, lifting, house/yardwork, driving, computer work. Therapeutic Activities:    [x] (64484 or 01021) Provided verbal/tactile cueing for activities related to improving balance, coordination, kinesthetic sense, posture, motor skill, proprioception and motor activation to allow for proper function of cervical, scapular, scapulothoracic and UE control with self care, carrying, lifting, driving/computer work.      Home Exercise Program:    [x] (10428) Reviewed/Progressed HEP activities related to strengthening, flexibility, endurance, ROM of cervical, scapular, scapulothoracic and UE control with self care, reaching, carrying, lifting, house/yardwork, driving/computer work  [] (83090) Reviewed/Progressed HEP activities related to improving balance, coordination, kinesthetic sense, posture, motor skill, proprioception of cervical, scapular, scapulothoracic and UE control with self care, reaching, carrying, lifting, house/yardwork, driving/computer work      Manual Treatments:  PROM / STM / Oscillations-Mobs:  G-I, II, III, IV (PA's, Inf., Post.)  [] (50153) Provided manual therapy to mobilize soft tissue/joints of cervical/CT, scapular GHJ and UE for the purpose of decreasing headache, modulating pain, promoting relaxation,  increasing ROM, reducing/eliminating soft tissue swelling/inflammation/restriction, improving soft tissue extensibility and allowing for proper ROM for normal function with self care, reaching, carrying, lifting, house/yardwork, driving/computer work    Modalities:  I    Charges:  Timed Code Treatment Minutes: 43   Total Treatment Minutes: 43       [] EVAL - LOW (51903)   [] EVAL - MOD (44265)  [] EVAL - HIGH (68131)  [] RE-EVAL (69257)  [x] LO(31907) x 2     [] NMR (39976) x 1   [] Ionto  [] Manual (97017) x 1       [] Ultrasound  [x] TA x 1    [] Mech Traction (99548)  [] Home Management Training x   [] ES (un) (67158):           [] AquaticX2    [] ES(attended) (82948)   [] Destini Los    [] Other:    GOALS:  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 Independent in HEP and progression per patient tolerance, in order to prevent re-injury. [x] Progressing: [] Met: [] Not Met: [] Adjusted  2. Patient will have a decrease in pain to facilitate improvement in movement, function, and ADLs as indicated by Functional Deficits. [x] Progressing: [] Met: [] Not Met: [] Adjusted    Long Term Goals: To be achieved in: 6 weeks  1. Disability index score of 15% or less for the NDI to assist with reaching prior level of function. [x] Progressing: [] Met: [] Not Met: [] Adjusted  2.  Patient will demonstrate increased AROM to [facility] of cervical/thoracic spine to allow for proper joint functioning as indicated by patients Functional Deficits. [x] Progressing: [] Met: [] Not Met: [] Adjusted  3. Patient will demonstrate an increase in postural awareness and control and activation of  Deep cervical stabilizers to allow for proper functional mobility as indicated by patients Functional Deficits. [x] Progressing: [] Met: [] Not Met: [] Adjusted  4. Patient will return to functional activities including work, lifting, without increased symptoms or restriction. [x] Progressing: [] Met: [] Not Met: [] Adjusted   5. Patient will improve B hip strength to 4+/5 in order to decrease pain and improve function. [x] Progressing: [] Met: [] Not Met: [] Adjusted  6. Patient will improve core strength and mobility for improved function by scoring 20% or less on JUAN PABLO. [x] Progressing: [] Met: [] Not Met: [] Adjusted    Overall Progression Towards Functional goals/ Treatment Progress Update:  [] Patient is progressing as expected towards functional goals listed. [x] Progression is slowed due to complexities/Impairments listed. [] Progression has been slowed due to co-morbidities. [] Plan just implemented, too soon to assess goals progression <30days   [] Goals require adjustment due to lack of progress  [] Patient is not progressing as expected and requires additional follow up with physician  [] Other    Persisting Functional Limitations/Impairments:  []Sitting []Standing   []Walking []Squatting/bending    []Stairs []ADL's    []Transfers []Reaching  [x]Housework [x]Lifting  []Driving [x]Job related tasks  [x]Sports/Recreation : disc golf  [x]Sleeping  []Other:    ASSESSMENT:  Progressions added in bold , pt having less pain due to improvement in core/hip strength. Pt reported he can tell his glutes are activating more and is helping his pain. Continue to progress as tolerated.      Treatment/Activity Tolerance:  [x] Patient able to complete tx  [] Patient limited by fatique  [x] Patient limited by pain  [] Patient limited by other medical complications  [] Other:      Prognosis: [x] Good [] Fair  [] Poor    Patient Requires Follow-up: [x] Yes  [] No    PLAN: See eval. PT 1-2x / week for 8 weeks. TOS, decrease upper trap recruitment, proper breathing, decrease neural tension. Pt insurance does not cover dry needling . 2/10 - ADD HIP STRENGTHENING , LUMBAR STABILITY , CORE STRENGTH . If pt does not progress - aquatic therapy   [x] Continue per plan of care [] Alter current plan (see comments)  [] Plan of care initiated [] Hold pending MD visit [] Discharge    Electronically signed by: Peyton Hernandez, PT       Note: If patient does not return for scheduled/ recommended follow up visits, this note will serve as a discharge from care along with most recent update on progress.

## 2021-03-24 ENCOUNTER — HOSPITAL ENCOUNTER (OUTPATIENT)
Dept: PHYSICAL THERAPY | Age: 46
Setting detail: THERAPIES SERIES
Discharge: HOME | End: 2021-03-24
Payer: MEDICARE
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
## 2021-03-29 ENCOUNTER — HOSPITAL ENCOUNTER (OUTPATIENT)
Dept: PHYSICAL THERAPY | Age: 46
Setting detail: THERAPIES SERIES
Discharge: HOME | End: 2021-03-29
Payer: MEDICARE

## 2021-03-29 PROCEDURE — 97110 THERAPEUTIC EXERCISES: CPT

## 2021-03-29 PROCEDURE — 97530 THERAPEUTIC ACTIVITIES: CPT
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
## 2021-03-31 ENCOUNTER — HOSPITAL ENCOUNTER (OUTPATIENT)
Dept: PHYSICAL THERAPY | Age: 46
Setting detail: THERAPIES SERIES
Discharge: HOME | End: 2021-03-31
Payer: MEDICARE

## 2021-03-31 PROCEDURE — 97150 GROUP THERAPEUTIC PROCEDURES: CPT

## 2021-03-31 PROCEDURE — 97113 AQUATIC THERAPY/EXERCISES: CPT

## 2021-03-31 NOTE — FLOWSHEET NOTE
120 flexion, 40 ext, SB 30 B , rotation ~10% limited bilaterally , B STRENGTH: hip flexion - 3+/5, hip abd 3+/5, hip ext 3+/5 , knee ext 4/5, knee flexion 4/5      1/25    Observation: upper trap over activation , rounded shoulders, FAIR posture     Test measurements:     1/25/21    EAST test - + for parathesias in B hands after 20 seconds.  Vascular refill in B hands - normal               CERV ROM L R L 1/25 R 1/25   Cervical Flexion 35   35   Cervical Extension 42   40   Cervical SB 25 27 25 25   Cervical rotation 50 55 45 45            ROM Left Right L 1/25 R 1/25   Shoulder Flex WNL WNL WNL WNL   Shoulder Abd WNL WNL WNL WNL   Shoulder ER C2 C2     Shoulder IR T8 L4                       Strength / Myotomes Left Right L 1/25 R 1/25   Cervical Flexion (C1-2)        Cervical Side-bending (C3)        Shoulder Shrug (C4)        Shoulder Flex 4 4 4 4   Shoulder Scap        Shoulder Abduction (C5) 4 4 4 4   Shoulder ER 4- 4- 4- 4-   Shoulder IR 4 4 4 4   Biceps (C6) 4 4 4 4   Triceps (C7) 4 4 4 4   Wrist Extension (C6)        Wrist Flexion (C7)                 Thumb Abduction (C8)        Finger Abduction (T1)                   RESTRICTIONS/PRECAUTIONS:     Exercises/Interventions:   Therapeutic Exercise (29374) or [phone] Resistance / level Sets/sec Reps Notes   Bike - upright  x3 min      UBE 1.0   Painful - stopped    pulleys        Chin tucks      Upper trap stretch      Post shoulder stretch       Doorway pec stretch    Hands high x3  Hands low x3    Standing thread the needle       S/L open book      TB:   Mid row  Ext   tricep ext  LPD   LIME      No money ER LIME 20    Wall push up with serratus +  10    Chin tuck with BUE flexion to 90       Thoracic ext sitting at chair    Towel at T6   Sitting cervical AROM SB    Pt Hands stabilizing C5-7   Supine flexion AROM      Supine Foam roll chest openers stretch - hands low  UE at 90 90   ''     Supine scalene stretch        Bridge with ADD squeeze  5''x20 SLR flexion  abd  x10 B  x10 B     IB S  2x30''     HSS at EOT  2x30''     Hip flexor stretch at stairs  2x30''     Piriformis stretch   2x30''     TG squats  3x10     SL clamshell LIME 2x10 B     SB LTR  2x10     SB DKC  5''x20     Standing SB EXT, SB    With TrA hold   Leg press # 3 10    Quantum knee ext  HS curl 20#  30# 3  3 10  10    Wall push up with SB  2 10    Wall sit  3 20''    Prone quad stretch   3 20''                                                                    Therapeutic Activities (99040)       diaphramic breathing in supine     Min cues to perform 1/25    progress note/reassessment , objective measures  x30 min   3/8   Re-assessment for LBP/HIPS    2/10          TRX squats   1 15    Lateral band walks LIME 10' 6    Monster walks fwd and backward Lime 10 6                                                     NMR re-education (75139)       Prone chin tucks       Prone scap retraction                            Median Nerve flossing   Ulnar nerve flossing               TrA bracing in supine  10''x10     TrA with PPT  10''x10     Supine head lifts   Side lying head lifts  10''x10  10''x10 B     Pelvic tilts on SB with TrA actvation A/P, M/L, CW/CWW  x30     LTR with SB and TrA activation   X20     multifidi rows 2 plates 9E09 B     Mid row  4 plates [id number]      LPD 4 plates [id number]                                               Manual Intervention (57288)       STM to upper trap, SOR, gentle distraction to cervical spine, pain free PROM                                              AquaticTherapy Dates of Service: 3/10, 3/17 , 3/31  Aquatic Visits Exercises/Activities: BLE   Transfers:          % Immersion:            Ambulation:   UE Exercises:       Forwards   x1 , x1 cool down Shoulder Shrugs      Lateral   x1 Shoulder Circles  xx    Retro   x1 , x1 cool down  Scapular Retraction  x15 CP    Marching   Push Downs       [place]   Punching     Jog    Rowing     Multifidi walkouts with paddle Elbow Flex/Ext x15 cp      Shldr Flex/Ext x15 cp      Shldr aBd/aDd x15 CP   LE Exercises:  Shldr Horiz aBd/aDd x15CP   HR/TR x15 Shldr IR/ER    Marches xx Arm Circles xx   Squats X15 PNF Diagonals    Hamstring Curls X15 Wall Push Ups xx   Hip Flexion (SLR) x15 Figure 8's    Hip aBduction (SLR) x15 Bilateral Pull Downs    Hip Extension (SLR) x15      Hip aDduction (SLR)      Hip Circles x15 Functional:    Hip IR/Er  Step up forward x10    TrA Set  10''x10 Step up lateral  xx   Pelvic Tilts  x10 a/p, M/L, CW/CWW Step down     Fig 8's   Lunges Forward    LE PNF  Lunges Retro      Lunges Lateral     Balance:   Lower ab curl with noodle     SLS        Tandem Stance       NBOS eyes open  Seated:     NBOS eyes closed  Ankle pumps     Hand to Opposite Knee  Ankle Circles     Fwd Step ups to SLS  Knee Flex/Ext    Lateral Step ups to SLS  Hip aBd/aDd    Stop/Go Gait   Bicycle       Ankle DF/PF      Ankle Inv/Ev    Stretching:       Gastroc/Soleus 2X30''     Hamstring  2X30'' Noodle:     Knee Flex Stretch  Leg Press    Piriformis  xx Noodle Hang at [place] [address]    Hip Flexor xx Noodle Hang Deep Water    SKTC  Noodle Bicycle     DKTC       ITB      Quad xx Deep Water:    Mid Back  xx Jog    UT  Jumping Jacks    Post Shoulder  Heel to Toe    Ladder Pull  Hand to Opposite Knee    Pec Stretch xx Rocking Horse      [employer] Skier          Cervical:   Other:     AROM Flexion  2 noodles under chest modified freestyle swim x2 - painful in neck - stopped    AROM Extension      AROM Side Bending      AROM Rotation      Chin Tucks      Chin Nods        Aquatic Abbreviation Key  B= Belt DB= Dumbells T= Theratube   H= Hydrotone N= Noodles W= Weights   P= Paddles S= Speedo equipment K= Kickboard         Patient education:  -pt educated on diagnosis, prognosis and expectations for rehab  -all pt questions were answered    Home Exercise Program:  Access Code: [email or web address]   URL: [email or web address]   Date: 01/04/2021   Prepared by: Rachael Cole lifting, house/yardwork, driving/computer work  [] (54454) Reviewed/Progressed HEP activities related to improving balance, coordination, kinesthetic sense, posture, motor skill, proprioception of cervical, scapular, scapulothoracic and UE control with self care, reaching, carrying, lifting, house/yardwork, driving/computer work      Manual Treatments:  PROM / STM / Oscillations-Mobs:  G-I, II, III, IV (PA's, Inf., Post.)  [] ([phone]) Provided manual therapy to mobilize soft tissue/joints of cervical/CT, scapular GHJ and UE for the purpose of decreasing headache, modulating pain, promoting relaxation,  increasing ROM, reducing/eliminating soft tissue swelling/inflammation/restriction, improving soft tissue extensibility and allowing for proper ROM for normal function with self care, reaching, carrying, lifting, house/yardwork, driving/computer work    Modalities:  I    Charges:  Timed Code Treatment Minutes: 50   Total Treatment Minutes: 50       [] EVAL - LOW (455 1011)   [] EVAL - MOD (45488)  [] EVAL - HIGH (51925)  [] RE-EVAL (38464)  [] KW(99548) x 2     [] NMR (12033) x 1   [] Ionto  [] Manual ([id number]) x 1       [] Ultrasound  [] TA x 1    [] Mech Traction (55379)  [] Home Management Training x   [] ES (un) (01064):           [x] AquaticX2    [] ES(attended) (19729)   [x] [employer]    [] Other:    GOALS:  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 Independent in HEP and progression per patient tolerance, in order to prevent re-injury. [x] Progressing: [] Met: [] Not Met: [] Adjusted  2. Patient will have a decrease in pain to facilitate improvement in movement, function, and ADLs as indicated by Functional Deficits. [x] Progressing: [] Met: [] Not Met: [] Adjusted    Long Term Goals: To be achieved in: 6 weeks  1.  Disability index score of 15% or less for the NDI to assist with reaching prior level of function. [x] Progressing: [] Met: [] Not Met: [] Adjusted  2. Patient will demonstrate increased AROM to [facility] of cervical/thoracic spine to allow for proper joint functioning as indicated by patients Functional Deficits. [x] Progressing: [] Met: [] Not Met: [] Adjusted  3. Patient will demonstrate an increase in postural awareness and control and activation of  Deep cervical stabilizers to allow for proper functional mobility as indicated by patients Functional Deficits. [x] Progressing: [] Met: [] Not Met: [] Adjusted  4. Patient will return to functional activities including work, lifting, without increased symptoms or restriction. [x] Progressing: [] Met: [] Not Met: [] Adjusted   5. Patient will improve B hip strength to 4+/5 in order to decrease pain and improve function. [x] Progressing: [] Met: [] Not Met: [] Adjusted  6. Patient will improve core strength and mobility for improved function by scoring 20% or less on JUAN PABLO. [x] Progressing: [] Met: [] Not Met: [] Adjusted    Overall Progression Towards Functional goals/ Treatment Progress Update:  [] Patient is progressing as expected towards functional goals listed. [x] Progression is slowed due to complexities/Impairments listed. [] Progression has been slowed due to co-morbidities. [] Plan just implemented, too soon to assess goals progression <30days   [] Goals require adjustment due to lack of progress  [] Patient is not progressing as expected and requires additional follow up with physician  [] Other    Persisting Functional Limitations/Impairments:  []Sitting []Standing   []Walking []Squatting/bending    []Stairs []ADL's    []Transfers []Reaching  [x]Housework [x]Lifting  []Driving [x]Job related tasks  [x]Sports/Recreation : disc golf  [x]Sleeping  []Other:    ASSESSMENT:  Pt with good water visit, had pain with modified swimming with noodles in neck.  Overall pt is feeling good, plan to d/c next week. Treatment/Activity Tolerance:  [x] Patient able to complete tx  [] Patient limited by fatique  [x] Patient limited by pain  [] Patient limited by other medical complications  [] Other:      Prognosis: [x] Good [] Fair  [] Poor    Patient Requires Follow-up: [x] Yes  [] No    PLAN: See eval. PT 1-2x / week for 8 weeks. TOS, decrease upper trap recruitment, proper breathing, decrease neural tension. Pt insurance does not cover dry needling . 2/10 - ADD HIP STRENGTHENING , LUMBAR STABILITY , CORE STRENGTH . If pt does not progress - aquatic therapy   [x] Continue per plan of care [] Alter current plan (see comments)  [] Plan of care initiated [] Hold pending MD visit [] Discharge    Electronically signed by: Tavares Garcia PT       Note: If patient does not return for scheduled/ recommended follow up visits, this note will serve as a discharge from care along with most recent update on progress.

## 2021-04-01 ENCOUNTER — TELEPHONE (OUTPATIENT)
Dept: ORTHOPEDIC SURGERY | Age: 46
End: 2021-04-01

## 2021-04-01 DIAGNOSIS — M54.12 CERVICAL RADICULAR PAIN: Primary | ICD-10-CM

## 2021-04-01 DIAGNOSIS — M75.101 TEAR OF RIGHT ROTATOR CUFF, UNSPECIFIED TEAR EXTENT, UNSPECIFIED WHETHER TRAUMATIC: ICD-10-CM

## 2021-04-01 NOTE — TELEPHONE ENCOUNTER
Other Patient had a couple questions regarding getting his mri scheduled. Patient states he had metal in his eye which halted the order and has a few more questions.  ph [phone]

## 2021-04-01 NOTE — TELEPHONE ENCOUNTER
Patient called back. He wants to now get the mri's.   I need to see if we can get the dates extended for approval.

## 2021-04-05 ENCOUNTER — HOSPITAL ENCOUNTER (OUTPATIENT)
Dept: PHYSICAL THERAPY | Age: 46
Setting detail: THERAPIES SERIES
Discharge: HOME | End: 2021-04-05
Payer: MEDICARE

## 2021-04-05 PROCEDURE — 97530 THERAPEUTIC ACTIVITIES: CPT

## 2021-04-05 NOTE — FLOWSHEET NOTE
[address] [facility] Physical Therapy  Phone: [phone]   Fax: [phone]          Physical Therapy Treatment Note/ Progress Report:     Date:  2021    Patient Name:  Terrell Christiansen    :  1975  MRN: [id number]  Restrictions/Precautions:    Medical/Treatment Diagnosis Information:  Diagnosis: S16. 1XXA (ICD-10-CM) - Strain of neck muscle, initial encounter , W25.086O (ICD-10-CM) - Strain of left shoulder, initial encounter, M16.0 (ICD-10-CM) - Bilateral hip joint arthritis  Treatment Diagnosis: Impaired posture, increased muscle tension, decreased cervical ROM strength, BUE decreased strength, decrease hip/core strength, low back pain. Insurance/Certification information:  PT Insurance Information: Elida  Physician Information:  Referring Practitioner: Domingo Marie MD , MIGUELITO Ramirez  Plan of care signed (Y/N): [x]  Yes []  No      Date of Patient follow up with Physician:      Progress Report: []  Yes  [x]  No     Date Range for reporting period:  Beginnin21  Endin21    Progress report due (10 Rx/or 30 days whichever is less):  77    Recertification due (POC duration/ or 90 days whichever is less):  5/3/21    Visit # Insurance Allowable Auth required? 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 States he felt good after the shifts with minimal pain.       OBJECTIVE:       : Lumbar ROM - 125 flexion, 40 ext, R SB 25 , L SB 30 , rotation ~5% limited bilaterally , B STRENGTH: hip flexion - 4/5, hip abd 4/5, hip ext 4/5 , knee ext 4+/5, knee flexion 4+/5    3/8/21 - Lumbar ROM - 125 flexion, 40 ext, R SB 25 , L SB 30 , rotation ~5% limited bilaterally , B STRENGTH: hip flexion - 4-/5, hip abd 4-/5, hip ext 4/5 , knee ext 4+/5, knee flexion 4+/5    2/10: Lumbar ROM - 120 flexion, 40 ext, SB 30 B , rotation ~10% limited bilaterally , B STRENGTH: hip flexion - 3+/5, hip abd 3+/5, hip ext 3+/5 , knee ext 4/5, knee flexion 4/5      1/25    Observation: upper trap over activation , rounded shoulders, FAIR posture     Test measurements:     1/25/21    EAST test - + for parathesias in B hands after 20 seconds.  Vascular refill in B hands - normal               CERV ROM L R L 1/25 R 1/25 L 4/5 R 4/5   Cervical Flexion 35   35 41    Cervical Extension 42   40 39    Cervical SB 25 27 25 25 35 35   Cervical rotation 50 55 45 45 58 60              ROM Left Right L 1/25 R 1/25     Shoulder Flex WNL WNL WNL WNL WNL WNL   Shoulder Abd WNL WNL WNL WNL WNL WNL   Shoulder ER C2 C2   C7 C7   Shoulder IR T8 L4   T8 T8                         Strength / Myotomes Left Right L 1/25 R 1/25 L 4/5 R 4/5   Cervical Flexion (C1-2)          Cervical Side-bending (C3)          Shoulder Shrug (C4)          Shoulder Flex 4 4 4 4 4 4   Shoulder Scap          Shoulder Abduction (C5) 4 4 4 4 4 4   Shoulder ER 4- 4- 4- 4- 4- 4-   Shoulder IR 4 4 4 4 4 4   Biceps (C6) 4 4 4 4 4+ 4+   Triceps (C7) 4 4 4 4 4+ 4+   Wrist Extension (C6)          Wrist Flexion (C7)                     Thumb Abduction (C8)          Finger Abduction (T1)                     RESTRICTIONS/PRECAUTIONS:     Exercises/Interventions:   Therapeutic Exercise (03371) or [id number] Resistance / level Sets/sec Reps Notes   Bike - upright  x3 min      UBE 1.0   Painful - stopped    pulleys        Chin tucks      Upper trap stretch      Post shoulder stretch       Doorway pec stretch    Hands high x3  Hands low x3    Standing thread the needle       S/L open book      TB:   Mid row  Ext   tricep ext  LPD   LIME      No money ER LIME 20    Wall push up with serratus +  10    Chin tuck with BUE flexion to 90       Thoracic ext sitting at chair    Towel at T6   Sitting cervical AROM SB    Pt Hands stabilizing C5-7   Supine flexion AROM      Supine Foam roll chest openers stretch - hands low  UE at 90 90   ''     Supine scalene stretch        Bridge with ADD squeeze  5''x20     SLR flexion  abd  x10 B  x10 B     IB S  2x30''     HSS at EOT  2x30''     Hip flexor stretch at stairs  2x30''     Piriformis stretch   2x30''     TG squats  3x10     SL clamshell LIME 2x10 B     SB LTR  2x10     SB DKC  5''x20     Standing SB EXT, SB    With TrA hold   Leg press # 3 10    Quantum knee ext  HS curl 20#  30# 3  3 10  10    Wall push up with SB  2 10    Wall sit  3 20''    Prone quad stretch   3 20''                                                                    Therapeutic Activities (74246)       diaphramic breathing in supine     Min cues to perform 1/25    progress note/reassessment , objective measures  x30 min   3/8   Re-assessment for LBP/HIPS    2/10          TRX squats   1 15    Lateral band walks LIME 10' 6    Monster walks fwd and backward Lime 10 6    Objective measures x38 min    4/5                                             NMR re-education (37513)       Prone chin tucks       Prone scap retraction                            Median Nerve flossing   Ulnar nerve flossing               TrA bracing in supine  10''x10     TrA with PPT  10''x10     Supine head lifts   Side lying head lifts  10''x10  10''x10 B     Pelvic tilts on SB with TrA actvation A/P, M/L, CW/CWW  x30     LTR with SB and TrA activation   X20     multifidi rows 2 plates 4T52 B     Mid row  4 plates 2Q10      LPD 4 plates 5B40                                               Manual Intervention (00687)       STM to upper trap, SOR, gentle distraction to cervical spine, pain free PROM                                              AquaticTherapy Dates of Service: 3/10, 3/17 , 3/31  Aquatic Visits Exercises/Activities: BLE   Transfers:          % Immersion:            Ambulation:   UE Exercises:       Forwards   x1 , x1 cool down Shoulder Shrugs      Lateral   x1 Shoulder Circles  xx    Retro   x1 , x1 cool down  Scapular Retraction  x15 CP    Marching   Push Downs       [place]   Punching     Jog    Rowing     Multifidi walkouts with paddle   Elbow Flex/Ext x15 cp      Shldr Flex/Ext x15 cp      Shldr aBd/aDd x15 CP   LE Exercises:  Shldr Horiz aBd/aDd x15CP   HR/TR x15 Shldr IR/ER    Marches xx Arm Circles xx   Squats X15 PNF Diagonals    Hamstring Curls X15 Wall Push Ups xx   Hip Flexion (SLR) x15 Figure 8's    Hip aBduction (SLR) x15 Bilateral Pull Downs    Hip Extension (SLR) x15      Hip aDduction (SLR)      Hip Circles x15 Functional:    Hip IR/Er  Step up forward x10    TrA Set  10''x10 Step up lateral  xx   Pelvic Tilts  x10 a/p, M/L, CW/CWW Step down     Fig 8's   Lunges Forward    LE PNF  Lunges Retro      Lunges Lateral     Balance:   Lower ab curl with noodle     SLS        Tandem Stance       NBOS eyes open  Seated:     NBOS eyes closed  Ankle pumps     Hand to Opposite Knee  Ankle Circles     Fwd Step ups to SLS  Knee Flex/Ext    Lateral Step ups to SLS  Hip aBd/aDd    Stop/Go Gait   Bicycle       Ankle DF/PF      Ankle Inv/Ev    Stretching:       Gastroc/Soleus 2X30''     Hamstring  2X30'' Noodle:     Knee Flex Stretch  Leg Press    Piriformis  xx Noodle Hang at [place]    Hip Flexor xx Noodle Hang Deep Water    SKTC  Noodle Bicycle     DKTC       ITB      Quad xx Deep Water:    Mid Back  xx Jog    UT  Jumping Jacks    Post Shoulder  Heel to Toe    Ladder Pull  Hand to Opposite Knee    Pec Stretch xx Rocking Horse      [employer] Skier          Cervical:   Other:     AROM Flexion  2 noodles under chest modified freestyle swim x2 - painful in neck - stopped    AROM Extension AROM Side Bending      AROM Rotation      Chin Tucks      Chin Nods        Aquatic Abbreviation Key  B= Belt DB= Dumbells T= Theratube   H= Hydrotone N= Noodles W= Weights   P= Paddles S= Speedo equipment K= Kickboard         Patient education:  -pt educated on diagnosis, prognosis and expectations for rehab  -all pt questions were answered    Home Exercise Program:  Access Code: [email or web address]   URL: [email or web address]   Date: 01/04/2021   Prepared by: Matthias Corado     Exercises   Seated Scapular Retraction - 10 reps - 3 sets - 1x daily - 7x weekly   Seated Cervical Retraction - 10 reps - 3 sets - 1x daily - 7x weekly   Standing Isometric Cervical Flexion with Manual Resistance - 10 reps - 3 sets - 1x daily - 7x weekly   Standing Isometric Cervical Extension with Manual Resistance - 10 reps - 3 sets - 1x daily - 7x weekly   Seated Isometric Cervical Sidebending - 10 reps - 3 sets - 1x daily - 7x weekly     Access Code: [email or web address]   URL: [employer]/   Date: 01/14/2021   Prepared by: Matthias Corado     Exercises   Seated Levator Scapulae Stretch - 10 reps - 3 sets - 1x daily - 7x weekly   Seated Cervical Sidebending Stretch - 10 reps - 3 sets - 1x daily - 7x weekly   Doorway Pec Stretch at 90 Degrees Abduction - 10 reps - 3 sets - 1x daily - 7x weekly   Standing Shoulder Posterior Capsule Stretch - 10 reps - 3 sets - 1x daily - 7x weekly       Access Code: [email or web address]   URL: [employer]/   Date: 01/18/2021   Prepared by: Matthias Corado     Exercises   Sidelying Thoracic Rotation - 10 reps - 3 sets - 1x daily - 7x weekly   Median Nerve Flossing - Tray - 10 reps - 3 sets - 1x daily - 7x weekly   Standing Radial Nerve Glide - 10 reps - 3 sets - 1x daily - 7x weekly   Ulnar Nerve Flossing - 10 reps - 3 sets - 1x daily - 7x weekly     Access Code: [email or web address]   URL: [email or web address]   Date: 01/21/2021   Prepared by: Matthias Corado     Exercises   Supine Diaphragmatic Breathing - 10 reps - 3 sets - 1x daily - 7x weekly   Seated Thoracic Self-Mobilization - 10 reps - 3 sets - 1x daily - 7x weekly     Access Code: [email or web address]   URL: [email or web address]   Date: 02/10/2021   Prepared by: Lesli Montanez     Exercises   Supine Bridge - 10 reps - 3 sets - 1x daily - 7x weekly   Supine Active Straight Leg Raise - 10 reps - 3 sets - 1x daily - 7x weekly   Sidelying Hip Abduction - 10 reps - 3 sets - 1x daily - 7x weekly   Seated Hamstring Stretch - 10 reps - 3 sets - 1x daily - 7x weekly     Access Code: [email or web address]   URL: [email or web address]   Date: 02/18/2021   Prepared by: Lesli Montanez     Exercises   Standing Hip Flexor Stretch - 10 reps - 3 sets - 1x daily - 7x weekly   Seated Piriformis Stretch with Trunk Bend - 10 reps - 3 sets - 1x daily - 7x weekly   Standing Gastroc Stretch - 10 reps - 3 sets - 1x daily - 7x weekly           Therapeutic Exercise and NMR EXR  [x] (59543) Provided verbal/tactile cueing for activities related to strengthening, flexibility, endurance, ROM  for improvements in cervical, postural, scapular, scapulothoracic and UE control with self care, reaching, carrying, lifting, house/yardwork, driving/computer work.    [] (16905) Provided verbal/tactile cueing for activities related to improving balance, coordination, kinesthetic sense, posture, motor skill, proprioception  to assist with cervical, scapular, scapulothoracic and UE control with self care, reaching, carrying, lifting, house/yardwork, driving/computer work.  [] (57621) Therapist is in constant attendance of 2 or more patients providing skilled therapy interventions, but not providing any significant amount of measurable one-on-one time to either patient, for improvements in cervical, scapular, scapulothoracic and UE control with self care, reaching, carrying, lifting, house/yardwork, driving, computer work.      Therapeutic Activities:    [x] (21876 or [id number]) Provided verbal/tactile cueing for activities related to Short Term Goals: To be achieved in: 2 weeks  1. Independent in HEP and progression per patient tolerance, in order to prevent re-injury. [] Progressing: [x] Met: [] Not Met: [] Adjusted  2. Patient will have a decrease in pain to facilitate improvement in movement, function, and ADLs as indicated by Functional Deficits. [] Progressing: [x] Met: [] Not Met: [] Adjusted    Long Term Goals: To be achieved in: 6 weeks  1. Disability index score of 15% or less for the NDI to assist with reaching prior level of function. [] Progressing: [] Met: [x] Not Met: [] Adjusted  2. Patient will demonstrate increased AROM to [facility] of cervical/thoracic spine to allow for proper joint functioning as indicated by patients Functional Deficits. [] Progressing: [x] Met: [] Not Met: [] Adjusted  3. Patient will demonstrate an increase in postural awareness and control and activation of  Deep cervical stabilizers to allow for proper functional mobility as indicated by patients Functional Deficits. [] Progressing: [x] Met: [] Not Met: [] Adjusted  4. Patient will return to functional activities including work, lifting, without increased symptoms or restriction. [] Progressing: [] Met: [x] Not Met: [] Adjusted   5. Patient will improve B hip strength to 4+/5 in order to decrease pain and improve function. [] Progressing: [] Met: [x] Not Met: [] Adjusted  6. Patient will improve core strength and mobility for improved function by scoring 20% or less on JUAN PABLO. [] Progressing: [] Met: [x] Not Met: [] Adjusted    Overall Progression Towards Functional goals/ Treatment Progress Update:  [] Patient is progressing as expected towards functional goals listed. [x] Progression is slowed due to complexities/Impairments listed. [] Progression has been slowed due to co-morbidities.   [] Plan just implemented, too soon to assess goals progression <30days   [] Goals require adjustment due to lack of progress  [] Patient is not progressing

## 2021-04-07 ENCOUNTER — HOSPITAL ENCOUNTER (OUTPATIENT)
Dept: PHYSICAL THERAPY | Age: 46
Setting detail: THERAPIES SERIES
Discharge: HOME | End: 2021-04-07
Payer: MEDICARE

## 2021-04-21 ENCOUNTER — HOSPITAL ENCOUNTER (OUTPATIENT)
Dept: MRI IMAGING | Age: 46
Discharge: HOME | End: 2021-04-21
Payer: MEDICARE

## 2021-04-21 DIAGNOSIS — M54.12 CERVICAL RADICULAR PAIN: ICD-10-CM

## 2021-04-21 DIAGNOSIS — M75.101 TEAR OF RIGHT ROTATOR CUFF, UNSPECIFIED TEAR EXTENT, UNSPECIFIED WHETHER TRAUMATIC: ICD-10-CM

## 2021-04-21 PROCEDURE — 72141 MRI NECK SPINE W/O DYE: CPT

## 2021-04-21 PROCEDURE — 73221 MRI JOINT UPR EXTREM W/O DYE: CPT

## 2021-05-26 ENCOUNTER — OFFICE VISIT (OUTPATIENT)
Dept: ORTHOPEDIC SURGERY | Age: 46
End: 2021-05-26
Payer: MEDICARE

## 2021-05-26 VITALS — BODY MASS INDEX: 22.22 KG/M2 | HEIGHT: 69 IN | WEIGHT: 150 LBS

## 2021-05-26 DIAGNOSIS — S46.811A INFRASPINATUS TENDON TEAR, RIGHT, INITIAL ENCOUNTER: ICD-10-CM

## 2021-05-26 DIAGNOSIS — M54.12 CERVICAL RADICULAR PAIN: Primary | ICD-10-CM

## 2021-05-26 PROCEDURE — G8420 CALC BMI NORM PARAMETERS: HCPCS | Performed by: PHYSICIAN ASSISTANT

## 2021-05-26 PROCEDURE — 99214 OFFICE O/P EST MOD 30 MIN: CPT | Performed by: PHYSICIAN ASSISTANT

## 2021-05-26 PROCEDURE — G8427 DOCREV CUR MEDS BY ELIG CLIN: HCPCS | Performed by: PHYSICIAN ASSISTANT

## 2021-05-26 PROCEDURE — 4004F PT TOBACCO SCREEN RCVD TLK: CPT | Performed by: PHYSICIAN ASSISTANT
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
## 2021-06-14 ENCOUNTER — OFFICE VISIT (OUTPATIENT)
Dept: ORTHOPEDIC SURGERY | Age: 46
End: 2021-06-14

## 2021-06-14 DIAGNOSIS — M75.101 TEAR OF RIGHT ROTATOR CUFF, UNSPECIFIED TEAR EXTENT, UNSPECIFIED WHETHER TRAUMATIC: Primary | ICD-10-CM

## 2021-06-14 DIAGNOSIS — M54.12 CERVICAL RADICULAR PAIN: ICD-10-CM

## 2021-06-14 PROCEDURE — 99243 OFF/OP CNSLTJ NEW/EST LOW 30: CPT | Performed by: ORTHOPAEDIC SURGERY

## 2021-06-14 PROCEDURE — 20610 DRAIN/INJ JOINT/BURSA W/O US: CPT | Performed by: ORTHOPAEDIC SURGERY

## 2021-06-14 RX ORDER — LIDOCAINE HYDROCHLORIDE 10 MG/ML
5 INJECTION, SOLUTION INFILTRATION; PERINEURAL ONCE
Status: COMPLETED | OUTPATIENT
Start: 2021-06-14 | End: 2021-06-14

## 2021-06-14 RX ADMIN — LIDOCAINE HYDROCHLORIDE 5 ML: 10 INJECTION, SOLUTION INFILTRATION; PERINEURAL at 16:21

## 2021-06-14 NOTE — PROGRESS NOTES
CHIEF COMPLAINT: Right shoulder pain    History:    Virgia Brittle is a 40 y.o. right handed male referred by MIGUELITO Alexander for Sport Medicine consultation for evaluation and treatment of Right shoulder pain. This is evaluated as a personal injury. The pain began years ago. Pain is rated as a 7/10. There was not an injury. Pain is located laterally. He also states he has some pain around his scapula. He also notes some neck pain. He does have some radicular pain down to his fingers and some numbness and tingling in his third and fourth fingers. He does state that several years ago he had an EMG, which showed that he had cubital and carpal tunnel syndrome. Pain is worse with abduction, lifting, overhead activity, and reaching behind his back. The patient has had extensive PT for his neck and shoulder, without improvement. . The patient has not had an injection. The patient has tried NSAIDs. Patient's occupation is a . Outside reports reviewed: [facility] Or office note, and PT notes. .    Past Medical History:   Diagnosis Date    Carpal tunnel syndrome     Cubital tunnel syndrome        Past Surgical History:   Procedure Laterality Date    MOUTH SURGERY      front tooth broke    WISDOM TOOTH EXTRACTION         Current Outpatient Medications on File Prior to Visit   Medication Sig Dispense Refill    meloxicam (MOBIC) 7.5 MG tablet TAKE 1 TABLET BY MOUTH TWICE DAILY 60 tablet 0     No current facility-administered medications on file prior to visit.        No Known Allergies    Social History     Socioeconomic History    Marital status: Single     Spouse name: Not on file    Number of children: Not on file    Years of education: Not on file    Highest education level: Not on file   Occupational History    Occupation: [employer]   Tobacco Use    Smoking status: Current Every Day Smoker     Packs/day: 0.50     Years: 5.00     Pack years: 2.50    Smokeless tobacco: Former User   Vaping Use  Vaping Use: Never used   Substance and Sexual Activity    Alcohol use: Yes     Comment: varies,     Drug use: Yes     Frequency: 3.0 times per week     Types: Marijuana    Sexual activity: Not on file   Other Topics Concern    Not on file   Social History Narrative    Not on file     Social Determinants of Health     Financial Resource Strain:     Difficulty of Paying Living Expenses:    Food Insecurity:     Worried About Running Out of Food in the Last Year:     [address] in the Last Year:    Transportation Needs:     Lack of Transportation (Medical):  Lack of Transportation (Non-Medical):    Physical Activity:     Days of Exercise per Week:     Minutes of Exercise per Session:    Stress:     Feeling of Stress :    Social Connections:     Frequency of Communication with Friends and Family:     Frequency of Social Gatherings with Friends and Family:     Attends Hindu Services:     Active Member of Clubs or Organizations:     Attends Club or Organization Meetings:     Marital Status:    Intimate Partner Violence:     Fear of Current or Ex-Partner:     Emotionally Abused:     Physically Abused:     Sexually Abused:        Family History   Problem Relation Age of Onset    Arthritis Other     Cancer Other        Review of Systems:   I have reviewed the clinically relevant past medical history, medications, allergies, family history, social history, and 13 point Review of Systems from the patient's recent history form & documented any details relevant to today's presenting complaints in the history above. The patient's self-reported past medical history, medications, allergies, family history, social history, and Review of Systems form from 11/3/20 have been scanned into the chart under the \"Media\" tab. No changes. Physical Examination:      Vital signs: There were no vitals taken for this visit.     General:   alert, appears stated age, cooperative and no distress Left Shoulder   Active ROM:   forward flexion 160, external rotation 60, internal rotation L4. Right shoulder: forward flexion 180, external rotation 80, internal rotation L4. Passive ROM:  forward flexion 180    Joint Tenderness:   scapula, lateral acromial, trapezius muscle   Neer:   positive   Grayson:   positive   Strength:   5/5 Supraspinatus, External rotation    Bilateral shoulders   Drop-arm test:   negative   Belly-press test:   negative   Bear-hug test:   negative   Speed's test:   negative   Bicipital groove tenderness:  negative   Boyd's test:   negative   Cross-body adduction test:   negative    AC joint tenderness:   negative     There are no skin lesions, cellulitis, or extreme edema in the upper extremities. Sensation is grossly intact to light touch bilaterally upper extremity. The patient has warm and well-perfused Bilateral upper extremities with brisk capillary refill. Imaging   Right Shoulder X-Ray 2/8/21: reviewed  AC Joint: narrowing moderate  Glenohumeral joint: no abnormalities noted  Elevation humeral head: absent    Right Shoulder MRI 4/21/21: I independently reviewed the images, as well as the radiology report. 1. Small (8 x 7 mm) interstitial tear (per findings section--tendinopathy) of the infraspinatus tendon.  No   complete full-thickness tear, retraction or atrophy.  Mild edema at the   infraspinatus myotendinous junction may reflect strain. 2. Mild supraspinatus tendinopathy. 3. The long head biceps tendon is intact. 4. Mild AC degenerative change and mass effect. 5. Edema in the soft tissues of the rotator interval.  Consider adhesive   capsulitis. MRI Cervical spine:  1. Mild-to-moderate spinal canal stenosis at C6-C7 with mild stenosis at   C5-C6. 2. Multilevel neural foraminal narrowing as above.    3. Minimal retrolisthesis at C5-C6 and C6-C7 with Modic type 1 degenerative   endplate changes at O6-D8 Modic type 2 degenerative endplate changes at C5-C6. EMG [facility] 7/2019:  1. Abnormal study of right upper limb. 2. Evidence of right ulnar motor neuropathy, with slowing across the elbow segment, consistent with right cubital tunnel syndrome. There is no evidence of acute muscle irritability in the first dorsal interosseus. 3. Evidence of right median sensory and motor neuropathy, consistent with moderate right carpal tunnel syndrome. There is no evidence of acute or chronic denervation in the abductor pollicis brevis. 4. Evidence of right Dhruv Peto anastomosis, a normal variant of median-ulnar innervation in the forearm, such that the abductor pollicis brevis (typically median innerved) has partial innervation from the ulnar nerve. This may be somewhat protection of carpal tunnel syndrome. 5. Some of his symptoms follow a right C6 distribution; however, there was no evidence of cervical radiculitis on testing today. 6. No electrodiagnostic evidence of right brachial plexopathy or myopathy in nerves / muscles tested. Assessment:      Right shoulder partial rotator cuff tear/tendinopathy  Cervical radiculopathy      Plan:      Natural history and expected course discussed. Questions answered. The risks and benefits of an injection were discussed with the patient. The patient had full opportunity to ask questions and all were answered. The patient then provided verbal informed consent. The skin was then prepped with betadine solution and alcohol. Under aseptic conditions, the  right subacromial space was injected with 4cc of 1% xylocaine. There were no immediate complications following the injection. The patient was advised of the possibility of injection site reaction and instructed to apply ice to the area and take NSAIDs if able. The patient stated that his shoulder/muscle felt somewhat numb, but the injection did not really even help his pain at all.   This signified a negative Neer test.    Given the negative Neer test, I discussed with the patient that I am not confident that his pain is specifically coming from his shoulder or that he would improve from shoulder arthroscopy. I recommend he go through with epidural steroid injections as he is scheduled for tomorrow. Follow-up as needed. Do Zendejas. Mann Duque MD  Orthopaedic Surgery and Sports Medicine     Disclaimer: This note was generated with use of a verbal recognition program (DRAGON) and an attempt was made to check for errors. It is possible that there are still dictated errors within this office note. If so, please bring any significant errors to my attention for an addendum. All efforts were made to ensure that this office note is accurate.